# Patient Record
Sex: MALE | Race: WHITE | NOT HISPANIC OR LATINO | URBAN - METROPOLITAN AREA
[De-identification: names, ages, dates, MRNs, and addresses within clinical notes are randomized per-mention and may not be internally consistent; named-entity substitution may affect disease eponyms.]

---

## 2018-07-02 ENCOUNTER — EMERGENCY (EMERGENCY)
Facility: HOSPITAL | Age: 33
LOS: 1 days | Discharge: HOME | End: 2018-07-02
Admitting: EMERGENCY MEDICINE
Payer: MEDICAID

## 2018-07-02 VITALS
TEMPERATURE: 98 F | RESPIRATION RATE: 16 BRPM | HEART RATE: 80 BPM | DIASTOLIC BLOOD PRESSURE: 84 MMHG | OXYGEN SATURATION: 100 % | SYSTOLIC BLOOD PRESSURE: 108 MMHG

## 2018-07-02 PROCEDURE — 99283 EMERGENCY DEPT VISIT LOW MDM: CPT | Mod: 25

## 2018-07-03 PROCEDURE — 73620 X-RAY EXAM OF FOOT: CPT | Mod: 26,LT

## 2018-07-03 RX ORDER — IBUPROFEN 200 MG
600 TABLET ORAL ONCE
Qty: 0 | Refills: 0 | Status: COMPLETED | OUTPATIENT
Start: 2018-07-03 | End: 2018-07-03

## 2018-07-03 RX ADMIN — Medication 600 MILLIGRAM(S): at 00:35

## 2018-07-03 NOTE — ED PROVIDER NOTE - OBJECTIVE STATEMENT
33y/o M with no pertinent PMHx, presents to the ED with L toe pain. Pt was carrying a case of water tonight when it fell on his L foot. Pt now has pain to his L 1st and 2nd toes, worse with bearing weight. Denies numbness/tingling/weakness, any other complaints. NKDA.

## 2018-07-03 NOTE — ED PROVIDER NOTE - PSH
Ankle injury  right ankle tendon repair 1999  Cyst  left chest wall removed under local anesthesia 2007

## 2019-06-15 NOTE — ED PROVIDER NOTE - CONDITION AT DISCHARGE:
Arrives to the ED from home via private vehicle for left ear pain and dizziness of which she has had several times previously. Reports being on seizure medications. She had her \"level\" drawn this past week and her provider \"didn't like it\".  
Improved

## 2019-12-06 ENCOUNTER — EMERGENCY (EMERGENCY)
Facility: HOSPITAL | Age: 34
LOS: 1 days | Discharge: HOME | End: 2019-12-06
Attending: EMERGENCY MEDICINE | Admitting: EMERGENCY MEDICINE
Payer: MEDICAID

## 2019-12-06 VITALS
RESPIRATION RATE: 16 BRPM | TEMPERATURE: 98 F | SYSTOLIC BLOOD PRESSURE: 110 MMHG | HEART RATE: 60 BPM | DIASTOLIC BLOOD PRESSURE: 62 MMHG | OXYGEN SATURATION: 98 %

## 2019-12-06 VITALS
SYSTOLIC BLOOD PRESSURE: 130 MMHG | HEART RATE: 78 BPM | TEMPERATURE: 98 F | RESPIRATION RATE: 16 BRPM | DIASTOLIC BLOOD PRESSURE: 87 MMHG | OXYGEN SATURATION: 99 %

## 2019-12-06 LAB
COHGB MFR BLDV: 0.5 % — SIGNIFICANT CHANGE UP (ref 0.5–1.5)
COHGB MFR BLDV: 15 G/DL — SIGNIFICANT CHANGE UP (ref 13–17)
METHGB MFR BLDV: 0.6 % — SIGNIFICANT CHANGE UP (ref 0–1.5)
OXYHGB MFR BLDV: 35 % — LOW (ref 59–84)

## 2019-12-06 PROCEDURE — 99283 EMERGENCY DEPT VISIT LOW MDM: CPT

## 2019-12-06 RX ORDER — ACETAMINOPHEN 500 MG
650 TABLET ORAL ONCE
Refills: 0 | Status: COMPLETED | OUTPATIENT
Start: 2019-12-06 | End: 2019-12-06

## 2019-12-06 RX ADMIN — Medication 650 MILLIGRAM(S): at 05:10

## 2019-12-06 NOTE — ED ADULT TRIAGE NOTE - CHIEF COMPLAINT QUOTE
Pt comes in for c/o being exposed to carbon monoxide from the gas stove being on without the flame. Pt states he thought it was just smells in the apt building from the work being done, and then checked the stove and realized the stove gas was on. Pt feeling lethargic, nauseous and has a bad HA, vs as noted. EKG to be completed.

## 2019-12-06 NOTE — ED ADULT NURSE NOTE - OBJECTIVE STATEMENT
pt received alert and oriented x4. pt states he was exposed to c arbon monoxide from the gas stop being on without the flame for hours. pt states that now he feels slightly lethargic, has a massive headache and some dizziness. respirations equal and unlabored. pt declines chest pain, sob, n/v/d,fevers,chills. pt nad. nsr on cm and 97% on room air. Call bell in reach, warm blanket provided, bed in lowest position, side rails up x2,safety maintained. will continue to monitor. pt waiting on MD jiang.

## 2019-12-06 NOTE — ED PROVIDER NOTE - PHYSICAL EXAMINATION
Gen:  NAD, alert and oriented  HEENT:  PERRL, EOMI, MMM  Heart:  RRR, no M/R/G  Lung:  CTA b/l, no rales or wheeze  Abd:  ND, soft, NT  Ext:  no edema  Skin:  No rash, no ecchymosis  Neuro:  Nonfocal

## 2019-12-06 NOTE — ED PROVIDER NOTE - OBJECTIVE STATEMENT
34 y.o. male p/w headache after exposure to natural gas.  He left the gas open on his stove, but there was no flame, and he developed a headache.  He is not sure how long the gas was one, but he could smell it.  The stove has to be lit with a lighter/match and there is no  light and no other gas-burning appliances in the 11th-floor apartment.  He denies medical conditions and does not smoke.  He takes sertraline for anxiety.

## 2019-12-06 NOTE — ED PROVIDER NOTE - PATIENT PORTAL LINK FT
You can access the FollowMyHealth Patient Portal offered by [facility] by registering at the following website: [email or web address]. By joining [facility]’s FollowMyHealth portal, you will also be able to view your health information using other applications (apps) compatible with our system.

## 2019-12-06 NOTE — ED ADULT NURSE NOTE - NSIMPLEMENTINTERV_GEN_ALL_ED
Implemented All Universal Safety Interventions:  [place] to call system. Call bell, personal items and telephone within reach. Instruct patient to call for assistance. Room bathroom lighting operational. Non-slip footwear when patient is off stretcher. Physically safe environment: no spills, clutter or unnecessary equipment. Stretcher in lowest position, wheels locked, appropriate side rails in place.

## 2019-12-06 NOTE — ED PROVIDER NOTE - ATTENDING CONTRIBUTION TO CARE
MD Camarillo:  I performed a face to face bedside interview with patient regarding history of present illness, review of symptoms and past medical history. I completed an independent physical exam(documented below).  I have discussed patient's plan of care with resident.   I agree with note as stated above, having amended the EMR as needed to reflect my findings. I have discussed the assessment and plan of care.  This includes during the time I functioned as the attending physician for this patient.  PE:  Gen: Alert, NAD  Head: NC, AT,  EOMI, normal lids/conjunctiva  ENT:  normal hearing, patent oropharynx without erythema/exudate  Neck: +supple, no tenderness/meningismus/JVD, +Trachea midline  Chest: no chest wall tenderness, equal chest rise  Pulm: Bilateral BS, normal resp effort, no wheeze/stridor/retractions  CV: RRR, no M/R/G, +dist pulses  Abd: +BS, soft, NT/ND  Rectal: deferred  Mskel: no edema/erythema/cyanosis  Skin: no rash  Neuro: AAOx3, no sensory/motor deficits, CN 2-12 intact   MDM:  33yo M, pms of anxiety and "heart murmur",  c/o headache after suspected exposure to natural gas (accidentally left gas open on stove for unk period of time). Denies use or knowledge of any other gas-burning appliances inside the home. Low suspicion for CO poisoning, but will get co-ox profile, give acetaminophen for ha, ecg, reassess for likely dc.

## 2019-12-06 NOTE — ED PROVIDER NOTE - NSFOLLOWUPINSTRUCTIONS_ED_ALL_ED_FT
Please follow up with your primary care physician.  If you develop worsening symptoms, please return to the ER.

## 2019-12-06 NOTE — ED PROVIDER NOTE - CLINICAL SUMMARY MEDICAL DECISION MAKING FREE TEXT BOX
Headache after natural gas exposure.  Patient is concerned about CO exposure despite no significant source and his home CO detector did not go off.  Will send Co-ox, reassurance.

## 2020-08-24 ENCOUNTER — OUTPATIENT (OUTPATIENT)
Dept: OUTPATIENT SERVICES | Facility: HOSPITAL | Age: 35
LOS: 1 days | End: 2020-08-24
Payer: MEDICAID

## 2020-08-24 VITALS
HEIGHT: 70 IN | DIASTOLIC BLOOD PRESSURE: 79 MMHG | SYSTOLIC BLOOD PRESSURE: 125 MMHG | HEART RATE: 85 BPM | RESPIRATION RATE: 18 BRPM | WEIGHT: 194.01 LBS | TEMPERATURE: 98 F | OXYGEN SATURATION: 98 %

## 2020-08-24 DIAGNOSIS — F41.9 ANXIETY DISORDER, UNSPECIFIED: ICD-10-CM

## 2020-08-24 DIAGNOSIS — Z01.818 ENCOUNTER FOR OTHER PREPROCEDURAL EXAMINATION: ICD-10-CM

## 2020-08-24 DIAGNOSIS — M05.472: ICD-10-CM

## 2020-08-24 DIAGNOSIS — S92.415D NONDISPLACED FRACTURE OF PROXIMAL PHALANX OF LEFT GREAT TOE, SUBSEQUENT ENCOUNTER FOR FRACTURE WITH ROUTINE HEALING: ICD-10-CM

## 2020-08-24 DIAGNOSIS — M79.675 PAIN IN LEFT TOE(S): ICD-10-CM

## 2020-08-24 DIAGNOSIS — Z98.890 OTHER SPECIFIED POSTPROCEDURAL STATES: Chronic | ICD-10-CM

## 2020-08-24 LAB
HCT VFR BLD CALC: 45.5 % — SIGNIFICANT CHANGE UP (ref 39–50)
HGB BLD-MCNC: 15.2 G/DL — SIGNIFICANT CHANGE UP (ref 13–17)
MCHC RBC-ENTMCNC: 29.6 PG — SIGNIFICANT CHANGE UP (ref 27–34)
MCHC RBC-ENTMCNC: 33.4 GM/DL — SIGNIFICANT CHANGE UP (ref 32–36)
MCV RBC AUTO: 88.5 FL — SIGNIFICANT CHANGE UP (ref 80–100)
NRBC # BLD: 0 /100 WBCS — SIGNIFICANT CHANGE UP (ref 0–0)
PLATELET # BLD AUTO: 235 K/UL — SIGNIFICANT CHANGE UP (ref 150–400)
RBC # BLD: 5.14 M/UL — SIGNIFICANT CHANGE UP (ref 4.2–5.8)
RBC # FLD: 11.9 % — SIGNIFICANT CHANGE UP (ref 10.3–14.5)
WBC # BLD: 7.43 K/UL — SIGNIFICANT CHANGE UP (ref 3.8–10.5)
WBC # FLD AUTO: 7.43 K/UL — SIGNIFICANT CHANGE UP (ref 3.8–10.5)

## 2020-08-24 PROCEDURE — G0463: CPT

## 2020-08-24 PROCEDURE — 36415 COLL VENOUS BLD VENIPUNCTURE: CPT

## 2020-08-24 PROCEDURE — 85027 COMPLETE CBC AUTOMATED: CPT

## 2020-08-24 NOTE — H&P PST ADULT - ATTENDING COMMENTS
Physician assistant statement  I have interviewed the patient and reviewed the chart.   There have been no changes in the patient s medical or physical hx since  his PST's and medical clearance.    He is Covid negative.      YANELI Perales PA-C

## 2020-08-24 NOTE — H&P PST ADULT - NSICDXPASTSURGICALHX_GEN_ALL_CORE_FT
PAST SURGICAL HISTORY:  Ankle injury right ankle tendon repair 1999    Cyst left chest wall removed under local anesthesia 2007    S/P excision of lipoma 2015 - posterior cervical neck

## 2020-08-24 NOTE — H&P PST ADULT - NSICDXPASTMEDICALHX_GEN_ALL_CORE_FT
PAST MEDICAL HISTORY:  Anxiety     Heart murmur asymptomatic diagnosised as an infant    Kidney stones     TREVER (obstructive sleep apnea) does not use CPAP PAST MEDICAL HISTORY:  Anxiety     Heart murmur asymptomatic diagnosis as an infant    Kidney stones     TREVER (obstructive sleep apnea) does not use CPAP

## 2020-08-24 NOTE — H&P PST ADULT - NSICDXPROBLEM_GEN_ALL_CORE_FT
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  Pre op instructions given and patient verbalized understanding.  CBC and medical clearance pending.  COVID-19 test scheduled for 08/25/2020.  Chlorhexidine wash given with instructions.      Problem: Anxiety  Assessment and Plan: Takes medications as prescribed

## 2020-08-24 NOTE — H&P PST ADULT - NSICDXFAMILYHX_GEN_ALL_CORE_FT
FAMILY HISTORY:  FH: diabetes mellitus, pre diabetic - no medication  FH: ovarian cancer, mother -  58y/o

## 2020-08-24 NOTE — H&P PST ADULT - HISTORY OF PRESENT ILLNESS
34 y/o male with PMH of anxiety presents for PST.  Reports that in the past he had an injury to his left foot which has not healed properly and still bothers him at time.  Now scheduled for arthrodesis of interphalangeal joint left hallux with Dr Choi on 08/28/2020.  COVID 19 test scheduled on 08/25/2020 at [place].

## 2020-08-25 ENCOUNTER — APPOINTMENT (OUTPATIENT)
Dept: DISASTER EMERGENCY | Facility: CLINIC | Age: 35
End: 2020-08-25

## 2020-08-26 LAB — SARS-COV-2 N GENE NPH QL NAA+PROBE: NOT DETECTED

## 2020-08-27 ENCOUNTER — TRANSCRIPTION ENCOUNTER (OUTPATIENT)
Age: 35
End: 2020-08-27

## 2020-08-28 ENCOUNTER — OUTPATIENT (OUTPATIENT)
Dept: OUTPATIENT SERVICES | Facility: HOSPITAL | Age: 35
LOS: 1 days | End: 2020-08-28
Payer: MEDICAID

## 2020-08-28 ENCOUNTER — RESULT REVIEW (OUTPATIENT)
Age: 35
End: 2020-08-28

## 2020-08-28 VITALS
OXYGEN SATURATION: 97 % | SYSTOLIC BLOOD PRESSURE: 116 MMHG | TEMPERATURE: 98 F | RESPIRATION RATE: 16 BRPM | HEART RATE: 61 BPM | DIASTOLIC BLOOD PRESSURE: 70 MMHG

## 2020-08-28 VITALS
HEART RATE: 66 BPM | HEIGHT: 70 IN | TEMPERATURE: 97 F | SYSTOLIC BLOOD PRESSURE: 119 MMHG | DIASTOLIC BLOOD PRESSURE: 79 MMHG | WEIGHT: 194.01 LBS | OXYGEN SATURATION: 98 % | RESPIRATION RATE: 14 BRPM

## 2020-08-28 DIAGNOSIS — Z98.890 OTHER SPECIFIED POSTPROCEDURAL STATES: Chronic | ICD-10-CM

## 2020-08-28 DIAGNOSIS — M05.472: ICD-10-CM

## 2020-08-28 DIAGNOSIS — M79.675 PAIN IN LEFT TOE(S): ICD-10-CM

## 2020-08-28 DIAGNOSIS — S92.415D NONDISPLACED FRACTURE OF PROXIMAL PHALANX OF LEFT GREAT TOE, SUBSEQUENT ENCOUNTER FOR FRACTURE WITH ROUTINE HEALING: ICD-10-CM

## 2020-08-28 PROCEDURE — C1713: CPT

## 2020-08-28 PROCEDURE — 73620 X-RAY EXAM OF FOOT: CPT | Mod: 26,LT

## 2020-08-28 PROCEDURE — 76000 FLUOROSCOPY <1 HR PHYS/QHP: CPT

## 2020-08-28 PROCEDURE — 28755 FUSION OF BIG TOE JOINT: CPT | Mod: TA

## 2020-08-28 PROCEDURE — 88304 TISSUE EXAM BY PATHOLOGIST: CPT | Mod: 26

## 2020-08-28 PROCEDURE — 88311 DECALCIFY TISSUE: CPT

## 2020-08-28 PROCEDURE — 88311 DECALCIFY TISSUE: CPT | Mod: 26

## 2020-08-28 PROCEDURE — 88304 TISSUE EXAM BY PATHOLOGIST: CPT

## 2020-08-28 PROCEDURE — 73620 X-RAY EXAM OF FOOT: CPT

## 2020-08-28 RX ORDER — SODIUM CHLORIDE 9 MG/ML
1000 INJECTION, SOLUTION INTRAVENOUS
Refills: 0 | Status: DISCONTINUED | OUTPATIENT
Start: 2020-08-28 | End: 2020-08-28

## 2020-08-28 RX ORDER — HYDROMORPHONE HYDROCHLORIDE 2 MG/ML
1 INJECTION INTRAMUSCULAR; INTRAVENOUS; SUBCUTANEOUS
Refills: 0 | Status: DISCONTINUED | OUTPATIENT
Start: 2020-08-28 | End: 2020-08-28

## 2020-08-28 RX ORDER — ONDANSETRON 8 MG/1
4 TABLET, FILM COATED ORAL ONCE
Refills: 0 | Status: DISCONTINUED | OUTPATIENT
Start: 2020-08-28 | End: 2020-08-28

## 2020-08-28 RX ORDER — HYDROMORPHONE HYDROCHLORIDE 2 MG/ML
0.5 INJECTION INTRAMUSCULAR; INTRAVENOUS; SUBCUTANEOUS
Refills: 0 | Status: DISCONTINUED | OUTPATIENT
Start: 2020-08-28 | End: 2020-08-28

## 2020-08-28 RX ADMIN — SODIUM CHLORIDE 50 MILLILITER(S): 9 INJECTION, SOLUTION INTRAVENOUS at 06:42

## 2020-08-28 NOTE — ASU DISCHARGE PLAN (ADULT/PEDIATRIC) - CARE PROVIDER_API CALL
Xiomy Choi  SURGERY  [address] [place] [address]  Phone: [phone]  Fax: [phone]  Scheduled Appointment: 09/01/2020

## 2020-08-28 NOTE — ASU DISCHARGE PLAN (ADULT/PEDIATRIC) - CALL YOUR DOCTOR IF YOU HAVE ANY OF THE FOLLOWING:
Bleeding that does not stop/Pain not relieved by Medications Bleeding that does not stop/Pain not relieved by Medications/Nausea and vomiting that does not stop

## 2020-08-28 NOTE — ASU PATIENT PROFILE, ADULT - PSH
Ankle injury  right ankle tendon repair 1999  Cyst  left chest wall removed under local anesthesia 2007  S/P excision of lipoma  2015 - posterior cervical neck

## 2020-08-28 NOTE — ASU PREOP CHECKLIST - ADDITIONAL CONSENTS
Representative Consent signed for Dany Gomez. The patient is aware of the role and responsibilities of the representative.

## 2020-08-28 NOTE — ASU PATIENT PROFILE, ADULT - PMH
Anxiety    Heart murmur  asymptomatic diagnosised as an infant  Kidney stones    TREVER (obstructive sleep apnea)  does not use CPAP

## 2021-09-23 PROBLEM — N20.0 CALCULUS OF KIDNEY: Chronic | Status: ACTIVE | Noted: 2020-08-24

## 2021-09-23 PROBLEM — G47.33 OBSTRUCTIVE SLEEP APNEA (ADULT) (PEDIATRIC): Chronic | Status: ACTIVE | Noted: 2020-08-24

## 2021-10-13 ENCOUNTER — OUTPATIENT (OUTPATIENT)
Dept: OUTPATIENT SERVICES | Facility: HOSPITAL | Age: 36
LOS: 1 days | End: 2021-10-13
Payer: MEDICAID

## 2021-10-13 VITALS
RESPIRATION RATE: 18 BRPM | TEMPERATURE: 97 F | OXYGEN SATURATION: 100 % | HEART RATE: 80 BPM | WEIGHT: 194.01 LBS | HEIGHT: 70 IN | DIASTOLIC BLOOD PRESSURE: 81 MMHG | SYSTOLIC BLOOD PRESSURE: 121 MMHG

## 2021-10-13 DIAGNOSIS — Z01.818 ENCOUNTER FOR OTHER PREPROCEDURAL EXAMINATION: ICD-10-CM

## 2021-10-13 DIAGNOSIS — Z98.890 OTHER SPECIFIED POSTPROCEDURAL STATES: Chronic | ICD-10-CM

## 2021-10-13 DIAGNOSIS — M21.612 BUNION OF LEFT FOOT: Chronic | ICD-10-CM

## 2021-10-13 DIAGNOSIS — M65.872 OTHER SYNOVITIS AND TENOSYNOVITIS, LEFT ANKLE AND FOOT: ICD-10-CM

## 2021-10-13 DIAGNOSIS — Z47.2 ENCOUNTER FOR REMOVAL OF INTERNAL FIXATION DEVICE: ICD-10-CM

## 2021-10-13 LAB
HCT VFR BLD CALC: 45.8 % — SIGNIFICANT CHANGE UP (ref 39–50)
HGB BLD-MCNC: 15.5 G/DL — SIGNIFICANT CHANGE UP (ref 13–17)
MCHC RBC-ENTMCNC: 29.6 PG — SIGNIFICANT CHANGE UP (ref 27–34)
MCHC RBC-ENTMCNC: 33.8 GM/DL — SIGNIFICANT CHANGE UP (ref 32–36)
MCV RBC AUTO: 87.4 FL — SIGNIFICANT CHANGE UP (ref 80–100)
NRBC # BLD: 0 /100 WBCS — SIGNIFICANT CHANGE UP (ref 0–0)
PLATELET # BLD AUTO: 233 K/UL — SIGNIFICANT CHANGE UP (ref 150–400)
RBC # BLD: 5.24 M/UL — SIGNIFICANT CHANGE UP (ref 4.2–5.8)
RBC # FLD: 12.1 % — SIGNIFICANT CHANGE UP (ref 10.3–14.5)
WBC # BLD: 7.34 K/UL — SIGNIFICANT CHANGE UP (ref 3.8–10.5)
WBC # FLD AUTO: 7.34 K/UL — SIGNIFICANT CHANGE UP (ref 3.8–10.5)

## 2021-10-13 PROCEDURE — 36415 COLL VENOUS BLD VENIPUNCTURE: CPT

## 2021-10-13 PROCEDURE — G0463: CPT

## 2021-10-13 PROCEDURE — 85027 COMPLETE CBC AUTOMATED: CPT

## 2021-10-13 NOTE — H&P PST ADULT - PROBLEM SELECTOR PLAN 1
Scheduled for removal of hardware left hallux, synovectomy left foot with Dr Choi on 10/22/2021.  CBC and medical clearance pending.  Pre op instructions given and patient verbalized understanding.  COVID-19 testing TBS - information provided.  NPO after midnight night before surgery.  TREVER precautions - hx TREVER - no CPAP use.  Told to stop ASA NSAIDs, vitamins and suppleemtns 1 week prior to surgery

## 2021-10-13 NOTE — H&P PST ADULT - NSICDXPASTSURGICALHX_GEN_ALL_CORE_FT
PAST SURGICAL HISTORY:  Ankle injury right ankle tendon repair 1999    Bunion, left foot 2020    Cyst left chest wall removed under local anesthesia 2007    S/P excision of lipoma 2015 - posterior cervical neck

## 2021-10-13 NOTE — H&P PST ADULT - NSICDXPASTMEDICALHX_GEN_ALL_CORE_FT
PAST MEDICAL HISTORY:  Anxiety     Heart murmur asymptomatic diagnosised as an infant    Kidney stones     TREVER (obstructive sleep apnea) does not use CPAP

## 2021-10-13 NOTE — H&P PST ADULT - NSICDXFAMILYHX_GEN_ALL_CORE_FT
FAMILY HISTORY:  FH: diabetes mellitus, pre diabetic - no medication  FH: ovarian cancer, mother -  56y/o

## 2021-10-13 NOTE — H&P PST ADULT - ATTENDING COMMENTS
Pt seen at bedside, chart reviewed. Pt feels well and is in his usual state of health. PMD cleared pt for the aforementioned procedure     Kacie Marin PA-C 10/22/21

## 2021-10-13 NOTE — H&P PST ADULT - HISTORY OF PRESENT ILLNESS
36 y/o male with PMH of anxiety presents for PST.  Reports that in the past he had an injury to his left foot which has not healed properly and still bothers him at time.  Now scheduled for arthrodesis of interphalangeal joint left hallux with Dr Choi on 08/28/2020.  COVID 19 test scheduled on 08/25/2020 at [place]. 34 y/o male with PMH of anxiety presents for PST. C/o left foot pain due to hardware placed with surgery last August.  Scheduled for removal of hardware left hallux, synovectomy left foot with Dr Choi on 10/22/2021.  COVID-19 testing TBS - information provided

## 2021-10-19 ENCOUNTER — APPOINTMENT (OUTPATIENT)
Dept: DISASTER EMERGENCY | Facility: CLINIC | Age: 36
End: 2021-10-19

## 2021-10-20 LAB — SARS-COV-2 N GENE NPH QL NAA+PROBE: NOT DETECTED

## 2021-10-21 ENCOUNTER — TRANSCRIPTION ENCOUNTER (OUTPATIENT)
Age: 36
End: 2021-10-21

## 2021-10-22 ENCOUNTER — OUTPATIENT (OUTPATIENT)
Dept: OUTPATIENT SERVICES | Facility: HOSPITAL | Age: 36
LOS: 1 days | End: 2021-10-22
Payer: MEDICAID

## 2021-10-22 ENCOUNTER — RESULT REVIEW (OUTPATIENT)
Age: 36
End: 2021-10-22

## 2021-10-22 VITALS
SYSTOLIC BLOOD PRESSURE: 116 MMHG | RESPIRATION RATE: 16 BRPM | OXYGEN SATURATION: 96 % | TEMPERATURE: 97 F | DIASTOLIC BLOOD PRESSURE: 70 MMHG | HEART RATE: 62 BPM

## 2021-10-22 VITALS
RESPIRATION RATE: 14 BRPM | TEMPERATURE: 98 F | OXYGEN SATURATION: 96 % | WEIGHT: 194.01 LBS | HEART RATE: 63 BPM | HEIGHT: 70 IN | DIASTOLIC BLOOD PRESSURE: 72 MMHG | SYSTOLIC BLOOD PRESSURE: 112 MMHG

## 2021-10-22 DIAGNOSIS — M21.612 BUNION OF LEFT FOOT: Chronic | ICD-10-CM

## 2021-10-22 DIAGNOSIS — Z47.2 ENCOUNTER FOR REMOVAL OF INTERNAL FIXATION DEVICE: ICD-10-CM

## 2021-10-22 DIAGNOSIS — Z98.890 OTHER SPECIFIED POSTPROCEDURAL STATES: Chronic | ICD-10-CM

## 2021-10-22 DIAGNOSIS — M65.872 OTHER SYNOVITIS AND TENOSYNOVITIS, LEFT ANKLE AND FOOT: ICD-10-CM

## 2021-10-22 PROCEDURE — 73620 X-RAY EXAM OF FOOT: CPT

## 2021-10-22 PROCEDURE — 88300 SURGICAL PATH GROSS: CPT

## 2021-10-22 PROCEDURE — 20680 REMOVAL OF IMPLANT DEEP: CPT

## 2021-10-22 PROCEDURE — 73620 X-RAY EXAM OF FOOT: CPT | Mod: 26,LT

## 2021-10-22 PROCEDURE — 88300 SURGICAL PATH GROSS: CPT | Mod: 26

## 2021-10-22 RX ORDER — MULTIVIT-MIN/FERROUS GLUCONATE 9 MG/15 ML
1 LIQUID (ML) ORAL
Qty: 0 | Refills: 0 | DISCHARGE

## 2021-10-22 RX ORDER — OXYCODONE HYDROCHLORIDE 5 MG/1
5 TABLET ORAL ONCE
Refills: 0 | Status: DISCONTINUED | OUTPATIENT
Start: 2021-10-22 | End: 2021-10-22

## 2021-10-22 RX ORDER — SERTRALINE 25 MG/1
1 TABLET, FILM COATED ORAL
Qty: 0 | Refills: 0 | DISCHARGE

## 2021-10-22 RX ORDER — SODIUM CHLORIDE 9 MG/ML
1000 INJECTION, SOLUTION INTRAVENOUS
Refills: 0 | Status: DISCONTINUED | OUTPATIENT
Start: 2021-10-22 | End: 2021-10-22

## 2021-10-22 RX ORDER — IBUPROFEN 200 MG
2 TABLET ORAL
Qty: 0 | Refills: 0 | DISCHARGE

## 2021-10-22 RX ADMIN — SODIUM CHLORIDE 50 MILLILITER(S): 9 INJECTION, SOLUTION INTRAVENOUS at 09:55

## 2021-10-22 NOTE — ASU DISCHARGE PLAN (ADULT/PEDIATRIC) - CARE PROVIDER_API CALL
Xiomy Choi (DPM)  Surgery  [facility] [place] [address]  Phone: [phone]  Fax: [phone]  Follow Up Time:

## 2021-10-22 NOTE — ASU PATIENT PROFILE, ADULT - TEACHING/LEARNING RELIGIOUS CONSIDERATIONS
none Duration Of Freeze Thaw-Cycle (Seconds): 0 Render Post-Care Instructions In Note?: no Post-Care Instructions: I reviewed with the patient in detail post-care instructions. Patient is to wear sunprotection, and avoid picking at any of the treated lesions. Pt may apply Vaseline to crusted or scabbing areas. Detail Level: Detailed Consent: The patient's consent was obtained including but not limited to risks of crusting, scabbing, blistering, scarring, darker or lighter pigmentary change, recurrence, incomplete removal and infection. Number Of Freeze-Thaw Cycles: 2 freeze-thaw cycles

## 2021-10-27 LAB — SURGICAL PATHOLOGY STUDY: SIGNIFICANT CHANGE UP

## 2022-04-14 ENCOUNTER — EMERGENCY (EMERGENCY)
Facility: HOSPITAL | Age: 37
LOS: 1 days | Discharge: HOME | End: 2022-04-14
Attending: EMERGENCY MEDICINE | Admitting: EMERGENCY MEDICINE
Payer: MEDICAID

## 2022-04-14 VITALS
OXYGEN SATURATION: 100 % | HEIGHT: 70 IN | SYSTOLIC BLOOD PRESSURE: 118 MMHG | TEMPERATURE: 98 F | DIASTOLIC BLOOD PRESSURE: 72 MMHG | HEART RATE: 82 BPM | RESPIRATION RATE: 18 BRPM

## 2022-04-14 DIAGNOSIS — Z98.890 OTHER SPECIFIED POSTPROCEDURAL STATES: Chronic | ICD-10-CM

## 2022-04-14 DIAGNOSIS — M21.612 BUNION OF LEFT FOOT: Chronic | ICD-10-CM

## 2022-04-14 PROCEDURE — 99283 EMERGENCY DEPT VISIT LOW MDM: CPT

## 2022-04-14 RX ORDER — LIDOCAINE HYDROCHLORIDE AND EPINEPHRINE 10; 10 MG/ML; UG/ML
20 INJECTION, SOLUTION INFILTRATION; PERINEURAL ONCE
Refills: 0 | Status: COMPLETED | OUTPATIENT
Start: 2022-04-14 | End: 2022-04-14

## 2022-04-14 RX ORDER — ACETAMINOPHEN 500 MG
975 TABLET ORAL ONCE
Refills: 0 | Status: COMPLETED | OUTPATIENT
Start: 2022-04-14 | End: 2022-04-14

## 2022-04-14 RX ORDER — IBUPROFEN 200 MG
400 TABLET ORAL ONCE
Refills: 0 | Status: COMPLETED | OUTPATIENT
Start: 2022-04-14 | End: 2022-04-14

## 2022-04-14 RX ADMIN — Medication 975 MILLIGRAM(S): at 21:16

## 2022-04-14 RX ADMIN — Medication 400 MILLIGRAM(S): at 21:16

## 2022-04-14 RX ADMIN — Medication 300 MILLIGRAM(S): at 21:16

## 2022-04-14 RX ADMIN — LIDOCAINE HYDROCHLORIDE AND EPINEPHRINE 20 MILLILITER(S): 10; 10 INJECTION, SOLUTION INFILTRATION; PERINEURAL at 21:22

## 2022-04-14 NOTE — ED PROVIDER NOTE - OBJECTIVE STATEMENT
Nadia: History of recurrent cysts, such as on chest. Presents with two days cyst on right posterior scalp. No fever. It is painful. He takes ibuprofen. POCUS reveals a small food collection.

## 2022-04-14 NOTE — ED PROVIDER NOTE - PATIENT PORTAL LINK FT
You can access the FollowMyHealth Patient Portal offered by [facility] by registering at the following website: [email or web address]. By joining [facility]’s FollowMyHealth portal, you will also be able to view your health information using other applications (apps) compatible with our system.

## 2022-04-14 NOTE — ED PROVIDER NOTE - NSPTACCESSSVCSAPPTDETAILS_ED_ALL_ED_FT
Recurrent skin cysts. Has one on his scalp that was incised and drained. Has had a Dermatologist remove the cysts in the past.

## 2022-04-14 NOTE — ED ADULT TRIAGE NOTE - CHIEF COMPLAINT QUOTE
2 days ago noticed small cyst on R side of scalp - HX cysts - area is raised reddened with skin intact - no other symptoms today

## 2022-04-14 NOTE — ED PROVIDER NOTE - CLINICAL SUMMARY MEDICAL DECISION MAKING FREE TEXT BOX
Nadia: Likely abscess or cyst. Will do incision and drainage. Clindamycin and pain medication. Follow up with a Dermatologist.

## 2022-04-14 NOTE — ED PROVIDER NOTE - NSFOLLOWUPINSTRUCTIONS_ED_ALL_ED_FT
Take medications as prescribed for: cyst, possible early abscess.    Tylenol 500 mg (1 or 2 every 6 hours) and/or naproxen 500 mg (1 every 12 hours) for pain.     You will receive a call from our Care Coordinators to arrange an appointment with a Dermatologist for consideration for cyst removal.    Return if fever, or if swelling returns.

## 2022-04-14 NOTE — ED PROVIDER NOTE - PHYSICAL EXAMINATION
Well appearing, well nourished, awake, alert, oriented to person, place, time/situation and in no apparent distress.    Airway patent    Eyes without scleral injection. No jaundice.    Strong pulse.    Respirations unlabored.    Abdomen soft, non-tender, no guarding.    Spine appears normal, range of motion is not limited, no muscle or joint tenderness.    Alert and oriented, no gross motor or sensory deficits.    Skin: 3 cm raised, red, mildly-tender area posterior top of scalp.    No SI/HI.

## 2022-04-14 NOTE — ED ADULT NURSE NOTE - OBJECTIVE STATEMENT
Pt A&Ox4, ambulatory. PT in NAD,resp equal nand unlabored. pt presents with cyst on the top of his head after getting a haircut. PT c/o of pain, redness and headache. PT denies; discharge, blurred vision, fever/chills.

## 2023-05-16 NOTE — ASU PATIENT PROFILE, ADULT - FALL HARM RISK CONCLUSION
Universal Safety Interventions Cephalexin Pregnancy And Lactation Text: This medication is Pregnancy Category B and considered safe during pregnancy.  It is also excreted in breast milk but can be used safely for shorter doses.

## 2023-07-14 NOTE — ED ADULT TRIAGE NOTE - SPO2 (%)
[facility] Note    Chief Complaint      Chief Complaint   Patient presents with    Pre-op Exam     History of Present Illness      Socorro Huang is a 46 y.o. female patient of Dr. Adams's with chronic conditions of chronic migraine, anxiety, hypothyroid, GERD, insomnia who presents today for medical clearance for right foot great toe surgery with Dr. Ragsdale.  Surgery date not made yet.  Patient feeling well no complaints, denies shortness of breath or chest pain reviewed meds and history patient.  No concerns of bowel or bladder dysfunction, patient stays hydrated and active.    Labs ordered  Allergies:  Celexa-nausea and stomach upset    Vital signs:  110/70, 73, 16,98%RA    PSHx:  Left sigmoidectomy, colonoscopy, breast reconstruction, partial hysterectomy, oophorectomy, thyroidectomy partial, tonsillectomy, tubal ligation    ELAINE:  Negative  Problems with anesthesia:  None  Stable on all medications    Patient never smoked    Health Maintenance   Topic Date Due    Hepatitis C Screening  Never done    Mammogram  11/05/2022    TETANUS VACCINE  11/14/2022    Lipid Panel  10/03/2027    DEXA Scan  Discontinued       Past Medical History:   Diagnosis Date    Abnormal Pap smear     Anxiety     celexa & prozac didn't help much    Constipation - functional     Headache(784.0)     Hemorrhoid     Leukopenia     benign, evaluated in [facility] y 2000    Menorrhagia     Strain of neck muscle     tx with PT at [facility] in past       Past Surgical History:   Procedure Laterality Date    ANOSCOPY N/A 07/20/2022    Procedure: ANOSCOPY;  Surgeon: ASHTYN Meol MD;  Location: [address];  Service: Colon and Rectal;  Laterality: N/A;    BREAST RECONSTRUCTION  2018    BREAST SURGERY      COLON SURGERY  2014         COLONOSCOPY N/A 02/03/2022    Procedure: COLONOSCOPY;  Surgeon: ASHTYN Melo MD;  Location: [facility];  Service: Endoscopy;  Laterality: N/A;    DILATION AND CURETTAGE OF UTERUS   12/18/2012    complex hyperplasia, no atypia    FLEXIBLE SIGMOIDOSCOPY N/A 07/20/2022    Procedure: SIGMOIDOSCOPY, FLEXIBLE;  Surgeon: ASHTYN Melo MD;  Location: [facility] OR 2ND FLR;  Service: Colon and Rectal;  Laterality: N/A;    HEMORRHOID SURGERY  2014    HYSTERECTOMY      LAPAROSCOPIC SIGMOIDECTOMY Left 07/20/2022    Procedure: COLECTOMY, SIGMOID, LAPAROSCOPIC, ERAS low;  Surgeon: ASHTYN Melo MD;  Location: [facility] OR 2ND FLR;  Service: Colon and Rectal;  Laterality: Left;    MOBILIZATION OF SPLENIC FLEXURE N/A 07/20/2022    Procedure: MOBILIZATION, SPLENIC FLEXURE;  Surgeon: ASHTYN Melo MD;  Location: [facility] OR 2ND FLR;  Service: Colon and Rectal;  Laterality: N/A;    OOPHORECTOMY      PARTIAL HYSTERECTOMY  2013    for atypia    SMALL INTESTINE SURGERY  2017    THYROIDECTOMY, PARTIAL  2013    TONSILLECTOMY      TUBAL LIGATION         family history includes Alcohol abuse in her cousin and maternal uncle; Breast cancer in her maternal grandmother; COPD in her mother; Cancer in her father and mother; Depression in her sister; Diabetes in her mother and paternal grandmother; Drug abuse in her maternal aunt; Emphysema in her mother; Heart disease in her father; Hypertension in her paternal grandmother; Ovarian cancer in her maternal grandmother; Schizophrenia in her paternal aunt; Suicide in her brother.    Social History     Tobacco Use    Smoking status: Never    Smokeless tobacco: Never   Substance Use Topics    Alcohol use: Not Currently     Comment: socially    Drug use: No       Review of Systems   Constitutional:  Negative for chills and fever.   HENT:  Negative for congestion, sinus pain and sore throat.    Eyes:  Negative for blurred vision.   Respiratory:  Negative for cough, shortness of breath and wheezing.    Cardiovascular:  Negative for chest pain, palpitations and leg swelling.   Gastrointestinal:  Negative for abdominal pain, constipation, diarrhea, nausea and vomiting.    Genitourinary:  Negative for dysuria.   Musculoskeletal:  Positive for joint pain. Negative for myalgias.   Skin:  Negative for rash.   Neurological:  Negative for dizziness, weakness and headaches.   Psychiatric/Behavioral:  Negative for depression. The patient is not nervous/anxious.       Outpatient Encounter Medications as of 7/14/2023   Medication Sig Note Dispense Refill    butalbital-acetaminophen-caffeine -40 mg (FIORICET, ESGIC) -40 mg per tablet Take 1 tablet by mouth every 6 (six) hours.  12 tablet 5    co-enzyme Q-10 30 mg capsule Take by mouth.       fluticasone propionate (FLONASE) 50 mcg/actuation nasal spray 1 spray (50 mcg total) by Each Nostril route once daily. 7/19/2022: Take as prescribed am of procedure                      18 g 0    magnesium oxide (MAG-OX) 400 mg (241.3 mg magnesium) tablet Take 1 tablet (400 mg total) by mouth once daily.  30 tablet 5    traZODone (DESYREL) 100 MG tablet TAKE 1 TABLET BY MOUTH EVERY DAY IN THE EVENING  90 tablet 1    hydrOXYzine HCL (ATARAX) 25 MG tablet Take 1 tab at night as needed for anxiety/sleep (Patient not taking: Reported on 7/14/2023)  30 tablet 0    methylPREDNISolone (MEDROL DOSEPACK) 4 mg tablet use as directed (Patient not taking: Reported on 7/14/2023)  1 each 0    pantoprazole (PROTONIX) 40 MG tablet Take 1 tablet (40 mg total) by mouth once daily. (Patient not taking: Reported on 7/14/2023)  30 tablet 11    [DISCONTINUED] butalbital-acetaminophen-caffeine -40 mg (FIORICET, ESGIC) -40 mg per tablet Take 1 tablet by mouth every 6 (six) hours.        No facility-administered encounter medications on file as of 7/14/2023.        Review of patient's allergies indicates:   Allergen Reactions    Celexa [citalopram] Nausea Only     Stomach upset       Physical Exam      Vital Signs  Pulse: 73  Resp: 16  SpO2: 98 %  BP: 110/70  BP Location: Right arm  Patient Position: Sitting  Height and Weight  Weight: 71.4 kg (157 lb 6.5  oz)    Physical Exam  Vitals and nursing note reviewed.   Constitutional:       General: She is not in acute distress.     Appearance: Normal appearance. She is well-developed. She is not ill-appearing.   HENT:      Head: Normocephalic and atraumatic.      Right Ear: External ear normal.      Left Ear: External ear normal.   Eyes:      Conjunctiva/sclera: Conjunctivae normal.      Pupils: Pupils are equal, round, and reactive to light.   Neck:      Thyroid: No thyromegaly.      Vascular: No JVD.      Trachea: No tracheal deviation.   Cardiovascular:      Rate and Rhythm: Normal rate and regular rhythm.      Heart sounds: Normal heart sounds. No murmur heard.  Pulmonary:      Effort: Pulmonary effort is normal.      Breath sounds: Normal breath sounds.   Chest:      Chest wall: No tenderness.   Abdominal:      General: Bowel sounds are normal.      Palpations: Abdomen is soft.      Tenderness: There is no abdominal tenderness. There is no guarding.   Musculoskeletal:         General: Tenderness present. No swelling or deformity. Normal range of motion.      Cervical back: Normal range of motion and neck supple.      Right lower leg: No edema.   Lymphadenopathy:      Cervical: No cervical adenopathy.   Skin:     General: Skin is warm and dry.   Neurological:      General: No focal deficit present.      Mental Status: She is alert and oriented to person, place, and time.   Psychiatric:         Mood and Affect: Mood normal.         Behavior: Behavior normal.         Thought Content: Thought content normal.         Judgment: Judgment normal.        Laboratory:  CBC:  Lab Results   Component Value Date    WBC 3.14 (L) 01/13/2023    RBC 4.59 01/13/2023    HGB 14.8 01/13/2023    HCT 42.8 01/13/2023     01/13/2023    MCV 93 01/13/2023    MCH 32.2 (H) 01/13/2023    MCHC 34.6 01/13/2023    MCHC 33.6 10/03/2022    MCHC 34.0 07/21/2022     CMP:  Lab Results   Component Value Date    GLU 87 10/03/2022    CALCIUM 9.1  10/03/2022    ALBUMIN 3.9 10/03/2022    PROT 6.4 10/03/2022     10/03/2022    K 4.0 10/03/2022    CO2 23 10/03/2022     10/03/2022    BUN 15 10/03/2022    ALKPHOS 79 10/03/2022    ALT 66 (H) 10/03/2022    AST 35 10/03/2022    BILITOT 0.5 10/03/2022    BILITOT 0.4 11/27/2021    BILITOT 0.6 10/27/2014     URINALYSIS:  Lab Results   Component Value Date    COLORU Colorless (A) 08/09/2022    SPECGRAV 1.010 08/09/2022    PHUR 7.0 08/09/2022    PROTEINUA Negative 08/09/2022    BACTERIA Few (A) 10/08/2013    NITRITE Negative 08/09/2022    LEUKOCYTESUR Negative 08/09/2022    UROBILINOGEN Negative 10/27/2014      LIPIDS:  Lab Results   Component Value Date    TSH 0.897 10/03/2022    TSH 1.659 11/22/2017    TSH 1.846 08/01/2012    HDL 65 10/03/2022    HDL 54 10/27/2014    HDL 55 04/12/2013    CHOL 179 10/03/2022    CHOL 140 10/27/2014    CHOL 135 04/12/2013    TRIG 51 10/03/2022    TRIG 37 10/27/2014    TRIG 50 04/12/2013    LDLCALC 103.8 10/03/2022    LDLCALC 78.6 10/27/2014    LDLCALC 70.0 04/12/2013    CHOLHDL 36.3 10/03/2022    CHOLHDL 38.6 10/27/2014    CHOLHDL 40.7 04/12/2013    NONHDLCHOL 114 10/03/2022    NONHDLCHOL 86 10/27/2014    TOTALCHOLEST 2.8 10/03/2022    TOTALCHOLEST 2.6 10/27/2014    TOTALCHOLEST 2.5 04/12/2013     TSH:  Lab Results   Component Value Date    TSH 0.897 10/03/2022    TSH 1.659 11/22/2017    TSH 1.846 08/01/2012     A1C:  No results found for: HGBA1C      Assessment/Plan     Socorro Huang is a 46 y.o.female with:    Pre-op examination  -     CBC Auto Differential; Future; Expected date: 07/14/2023  -     Comprehensive Metabolic Panel; Future; Expected date: 07/14/2023    Hallux limitus of right foot  -     CBC Auto Differential; Future; Expected date: 07/14/2023  -     Comprehensive Metabolic Panel; Future; Expected date: 07/14/2023    Great toe pain, right  -     CBC Auto Differential; Future; Expected date: 07/14/2023  -     Comprehensive Metabolic Panel; Future; Expected date:  07/14/2023    Anxiety     PATIENT MEDICALLY OPTIMIZED FOR LOW RISK PROCEDURE AT LOW CARDIAC RISK PROFILE AND MAY CONTINUE WITH SURGERY     Pt will need knee scooter, crutches, and shower chair    Health Maintenance Due   Topic Date Due    Hepatitis C Screening  Never done    HIV Screening  Never done    COVID-19 Vaccine (4 - Pfizer series) 11/26/2021    Mammogram  11/05/2022    TETANUS VACCINE  11/14/2022        I spent 32 minutes on the day of this encounter for preparing for, evaluating, treating, and managing this patient.      -Continue current medications and maintain follow up with specialists.  Return to clinic AS NEEDED FOR ANY CONCERNS.    No follow-ups on file.       AGUSTIN SheltonC  [facility]                   99

## 2023-10-04 ENCOUNTER — EMERGENCY (EMERGENCY)
Facility: HOSPITAL | Age: 38
LOS: 1 days | Discharge: HOME | End: 2023-10-04
Admitting: EMERGENCY MEDICINE
Payer: MEDICAID

## 2023-10-04 VITALS
SYSTOLIC BLOOD PRESSURE: 121 MMHG | RESPIRATION RATE: 16 BRPM | HEART RATE: 74 BPM | DIASTOLIC BLOOD PRESSURE: 80 MMHG | TEMPERATURE: 98 F | OXYGEN SATURATION: 97 %

## 2023-10-04 VITALS
OXYGEN SATURATION: 99 % | SYSTOLIC BLOOD PRESSURE: 118 MMHG | DIASTOLIC BLOOD PRESSURE: 67 MMHG | RESPIRATION RATE: 16 BRPM | HEART RATE: 72 BPM

## 2023-10-04 DIAGNOSIS — M21.612 BUNION OF LEFT FOOT: Chronic | ICD-10-CM

## 2023-10-04 DIAGNOSIS — Z98.890 OTHER SPECIFIED POSTPROCEDURAL STATES: Chronic | ICD-10-CM

## 2023-10-04 LAB
ALBUMIN SERPL ELPH-MCNC: 4.4 G/DL — SIGNIFICANT CHANGE UP (ref 3.3–5)
ALP SERPL-CCNC: 59 U/L — SIGNIFICANT CHANGE UP (ref 40–120)
ALT FLD-CCNC: 33 U/L — SIGNIFICANT CHANGE UP (ref 4–41)
ANION GAP SERPL CALC-SCNC: 8 MMOL/L — SIGNIFICANT CHANGE UP (ref 7–14)
APPEARANCE UR: CLEAR — SIGNIFICANT CHANGE UP
AST SERPL-CCNC: 25 U/L — SIGNIFICANT CHANGE UP (ref 4–40)
B PERT DNA SPEC QL NAA+PROBE: SIGNIFICANT CHANGE UP
B PERT+PARAPERT DNA PNL SPEC NAA+PROBE: SIGNIFICANT CHANGE UP
BASOPHILS # BLD AUTO: 0.04 K/UL — SIGNIFICANT CHANGE UP (ref 0–0.2)
BASOPHILS NFR BLD AUTO: 0.5 % — SIGNIFICANT CHANGE UP (ref 0–2)
BILIRUB SERPL-MCNC: 0.4 MG/DL — SIGNIFICANT CHANGE UP (ref 0.2–1.2)
BILIRUB UR-MCNC: NEGATIVE — SIGNIFICANT CHANGE UP
BORDETELLA PARAPERTUSSIS (RAPRVP): SIGNIFICANT CHANGE UP
BUN SERPL-MCNC: 11 MG/DL — SIGNIFICANT CHANGE UP (ref 7–23)
C PNEUM DNA SPEC QL NAA+PROBE: SIGNIFICANT CHANGE UP
CALCIUM SERPL-MCNC: 9.9 MG/DL — SIGNIFICANT CHANGE UP (ref 8.4–10.5)
CHLORIDE SERPL-SCNC: 105 MMOL/L — SIGNIFICANT CHANGE UP (ref 98–107)
CO2 SERPL-SCNC: 27 MMOL/L — SIGNIFICANT CHANGE UP (ref 22–31)
COLOR SPEC: YELLOW — SIGNIFICANT CHANGE UP
CREAT SERPL-MCNC: 0.95 MG/DL — SIGNIFICANT CHANGE UP (ref 0.5–1.3)
DIFF PNL FLD: NEGATIVE — SIGNIFICANT CHANGE UP
EGFR: 104 ML/MIN/1.73M2 — SIGNIFICANT CHANGE UP
EOSINOPHIL # BLD AUTO: 0.15 K/UL — SIGNIFICANT CHANGE UP (ref 0–0.5)
EOSINOPHIL NFR BLD AUTO: 1.8 % — SIGNIFICANT CHANGE UP (ref 0–6)
FLUAV SUBTYP SPEC NAA+PROBE: SIGNIFICANT CHANGE UP
FLUBV RNA SPEC QL NAA+PROBE: SIGNIFICANT CHANGE UP
GLUCOSE SERPL-MCNC: 96 MG/DL — SIGNIFICANT CHANGE UP (ref 70–99)
GLUCOSE UR QL: NEGATIVE MG/DL — SIGNIFICANT CHANGE UP
HADV DNA SPEC QL NAA+PROBE: SIGNIFICANT CHANGE UP
HCOV 229E RNA SPEC QL NAA+PROBE: SIGNIFICANT CHANGE UP
HCOV HKU1 RNA SPEC QL NAA+PROBE: SIGNIFICANT CHANGE UP
HCOV NL63 RNA SPEC QL NAA+PROBE: SIGNIFICANT CHANGE UP
HCOV OC43 RNA SPEC QL NAA+PROBE: SIGNIFICANT CHANGE UP
HCT VFR BLD CALC: 40.9 % — SIGNIFICANT CHANGE UP (ref 39–50)
HGB BLD-MCNC: 14.1 G/DL — SIGNIFICANT CHANGE UP (ref 13–17)
HMPV RNA SPEC QL NAA+PROBE: SIGNIFICANT CHANGE UP
HPIV1 RNA SPEC QL NAA+PROBE: SIGNIFICANT CHANGE UP
HPIV2 RNA SPEC QL NAA+PROBE: SIGNIFICANT CHANGE UP
HPIV3 RNA SPEC QL NAA+PROBE: SIGNIFICANT CHANGE UP
HPIV4 RNA SPEC QL NAA+PROBE: SIGNIFICANT CHANGE UP
IANC: 5.17 K/UL — SIGNIFICANT CHANGE UP (ref 1.8–7.4)
IMM GRANULOCYTES NFR BLD AUTO: 0.4 % — SIGNIFICANT CHANGE UP (ref 0–0.9)
KETONES UR-MCNC: NEGATIVE MG/DL — SIGNIFICANT CHANGE UP
LEUKOCYTE ESTERASE UR-ACNC: NEGATIVE — SIGNIFICANT CHANGE UP
LYMPHOCYTES # BLD AUTO: 2.44 K/UL — SIGNIFICANT CHANGE UP (ref 1–3.3)
LYMPHOCYTES # BLD AUTO: 28.6 % — SIGNIFICANT CHANGE UP (ref 13–44)
M PNEUMO DNA SPEC QL NAA+PROBE: SIGNIFICANT CHANGE UP
MCHC RBC-ENTMCNC: 29 PG — SIGNIFICANT CHANGE UP (ref 27–34)
MCHC RBC-ENTMCNC: 34.5 GM/DL — SIGNIFICANT CHANGE UP (ref 32–36)
MCV RBC AUTO: 84.2 FL — SIGNIFICANT CHANGE UP (ref 80–100)
MONOCYTES # BLD AUTO: 0.7 K/UL — SIGNIFICANT CHANGE UP (ref 0–0.9)
MONOCYTES NFR BLD AUTO: 8.2 % — SIGNIFICANT CHANGE UP (ref 2–14)
NEUTROPHILS # BLD AUTO: 5.17 K/UL — SIGNIFICANT CHANGE UP (ref 1.8–7.4)
NEUTROPHILS NFR BLD AUTO: 60.5 % — SIGNIFICANT CHANGE UP (ref 43–77)
NITRITE UR-MCNC: NEGATIVE — SIGNIFICANT CHANGE UP
NRBC # BLD: 0 /100 WBCS — SIGNIFICANT CHANGE UP (ref 0–0)
NRBC # FLD: 0 K/UL — SIGNIFICANT CHANGE UP (ref 0–0)
PH UR: 5.5 — SIGNIFICANT CHANGE UP (ref 5–8)
PLATELET # BLD AUTO: 220 K/UL — SIGNIFICANT CHANGE UP (ref 150–400)
POTASSIUM SERPL-MCNC: 4.3 MMOL/L — SIGNIFICANT CHANGE UP (ref 3.5–5.3)
POTASSIUM SERPL-SCNC: 4.3 MMOL/L — SIGNIFICANT CHANGE UP (ref 3.5–5.3)
PROT SERPL-MCNC: 6.8 G/DL — SIGNIFICANT CHANGE UP (ref 6–8.3)
PROT UR-MCNC: NEGATIVE MG/DL — SIGNIFICANT CHANGE UP
RAPID RVP RESULT: DETECTED
RBC # BLD: 4.86 M/UL — SIGNIFICANT CHANGE UP (ref 4.2–5.8)
RBC # FLD: 12.8 % — SIGNIFICANT CHANGE UP (ref 10.3–14.5)
RSV RNA SPEC QL NAA+PROBE: SIGNIFICANT CHANGE UP
RV+EV RNA SPEC QL NAA+PROBE: DETECTED
SARS-COV-2 RNA SPEC QL NAA+PROBE: SIGNIFICANT CHANGE UP
SODIUM SERPL-SCNC: 140 MMOL/L — SIGNIFICANT CHANGE UP (ref 135–145)
SP GR SPEC: 1.02 — SIGNIFICANT CHANGE UP (ref 1–1.03)
TROPONIN T, HIGH SENSITIVITY RESULT: <6 NG/L — SIGNIFICANT CHANGE UP
UROBILINOGEN FLD QL: 0.2 MG/DL — SIGNIFICANT CHANGE UP (ref 0.2–1)
WBC # BLD: 8.53 K/UL — SIGNIFICANT CHANGE UP (ref 3.8–10.5)
WBC # FLD AUTO: 8.53 K/UL — SIGNIFICANT CHANGE UP (ref 3.8–10.5)

## 2023-10-04 PROCEDURE — 71046 X-RAY EXAM CHEST 2 VIEWS: CPT | Mod: 26

## 2023-10-04 PROCEDURE — 93010 ELECTROCARDIOGRAM REPORT: CPT

## 2023-10-04 PROCEDURE — 99285 EMERGENCY DEPT VISIT HI MDM: CPT

## 2023-10-04 RX ORDER — SODIUM CHLORIDE 9 MG/ML
1000 INJECTION INTRAMUSCULAR; INTRAVENOUS; SUBCUTANEOUS ONCE
Refills: 0 | Status: COMPLETED | OUTPATIENT
Start: 2023-10-04 | End: 2023-10-04

## 2023-10-04 RX ADMIN — SODIUM CHLORIDE 1000 MILLILITER(S): 9 INJECTION INTRAMUSCULAR; INTRAVENOUS; SUBCUTANEOUS at 21:18

## 2023-10-04 NOTE — ED ADULT NURSE NOTE - NSFALLUNIVINTERV_ED_ALL_ED
Bed/Stretcher in lowest position, wheels locked, appropriate side rails in place/Call bell, personal items and telephone in reach/Instruct patient to call for assistance before getting out of bed/chair/stretcher/Non-slip footwear applied when patient is off stretcher/[place] to call system/Physically safe environment - no spills, clutter or unnecessary equipment/Purposeful proactive rounding/Room/bathroom lighting operational, light cord in reach

## 2023-10-04 NOTE — ED ADULT TRIAGE NOTE - CHIEF COMPLAINT QUOTE
pt dx with UTI 3 days ago, on antibiotics with no relief, c/o increased chills and urinary frequency no phx

## 2023-10-04 NOTE — ED PROVIDER NOTE - NS_EDPROVIDERDISPOUSERTYPE_ED_A_ED
I have personally evaluated and examined the patient. The Attending was available to me as a supervising provider if needed.
22-Apr-2022 12:15

## 2023-10-04 NOTE — ED PROVIDER NOTE - PROGRESS NOTE DETAILS
Labs, UA, chest x-ray resulted within normal, EKG NSR nonischemic and troponin less than 6.  Discussed with patient RVP positive for rhino enterovirus which is likely cause of patient's symptoms.  Discussed as urinary frequency has been ongoing since May he needs to follow-up with a urologist.  Patient can continue taking antibiotics as previously prescribed.  Patient will return to the ER for any worsening or concerning symptoms.  At time of discharge patient is well-appearing, vital stable, patient has remained afebrile while in the ED.

## 2023-10-04 NOTE — ED ADULT NURSE NOTE - OBJECTIVE STATEMENT
Patient received to wellness, a&ox4, ambulatory. t dx with UTI 3 days ago, on antibiotics with no relief, c/o urinary frequency. Pt also endorsing intermittent chest pain. Pt denies chest pain, sob, n+v, headache, dizziness, fever, chills, dysuria. Breathing even, unlabored. 20g IV placed by left ac, labs collected and sent. Pending results.

## 2023-10-04 NOTE — ED PROVIDER NOTE - CLINICAL SUMMARY MEDICAL DECISION MAKING FREE TEXT BOX
38-year-old male with no stated past medical history presenting to the ER with multiple complaints.  Patient reports since May he has had urinary frequency.  Patient states last week he developed sinus congestion and cough, states his wife had COVID 2 weeks ago.  Patient went to urgent care last week when he had symptoms of viral URI, tested positive for rhino enterovirus on 9/26 also had a UA and culture sent that showed [phone] CFU's of Enterococcus faecalis and was started on cefuroxime. Pt reports body aches, chills, did have episode of chest tightness today. On exam pt is well appearing, afebrile, no abd/suprapubic tenderness, no reports of pain with BMs. Pt with likely viral illness, long standing urinary frequency since May with no dysuria. Low risk ACS, chest tightness likely MSK with body aches. Plan: cbc/cmp, trop, RVP, CXR, UA/UCX. Discussed with pt he will need outpatient urology follow up.

## 2023-10-04 NOTE — ED PROVIDER NOTE - PATIENT PORTAL LINK FT
You can access the FollowMyHealth Patient Portal offered by [facility] by registering at the following website: [email or web address]. By joining [facility]’s FollowMyHealth portal, you will also be able to view your health information using other applications (apps) compatible with our system.

## 2023-10-04 NOTE — ED PROVIDER NOTE - NSFOLLOWUPINSTRUCTIONS_ED_ALL_ED_FT
Follow-up with your primary care doctor within 1 week  Follow-up with your urologist within 1 week, referral list attached please call to make an appointment  Drink plenty of fluids  Take Tylenol 650 mg every 6 hours as needed for body ache  Continue taking antibiotic as previously prescribed to you  Return to the ER with any worsening or concerning symptoms pain, nausea, vomiting, fever/chills, weakness or any other

## 2023-10-04 NOTE — ED PROVIDER NOTE - OBJECTIVE STATEMENT
38-year-old male with no stated past medical history presenting to the ER with multiple complaints.  Patient reports since May he has had urinary frequency.  Patient states last week he developed sinus congestion and cough, states his wife had COVID 2 weeks ago.  Patient went to urgent care last week when he had symptoms of viral URI, tested positive for rhino enterovirus on 9/26 also had a UA and culture sent that showed [phone] CFU's of Enterococcus faecalis.  Patient reports that today he had chills, body aches, right shoulder pain.  Associated he did have chest tightness.  Patient concerned that he has been on 3 days of antibiotic for UTI continues to have symptoms. 38-year-old male with no stated past medical history presenting to the ER with multiple complaints.  Patient reports since May he has had urinary frequency.  Patient states last week he developed sinus congestion and cough, states his wife had COVID 2 weeks ago.  Patient went to urgent care last week when he had symptoms of viral URI, tested positive for rhino enterovirus on 9/26 also had a UA and culture sent that showed [phone] CFU's of Enterococcus faecalis and was started on cefuroxime.  Patient reports that today he had chills, body aches, right shoulder pain.  Associated he did have chest tightness.  Patient concerned that he has been on 3 days of antibiotic for UTI continues to have symptoms. 38-year-old male with no stated past medical history presenting to the ER with multiple complaints.  Patient reports since May he has had urinary frequency.  Patient states last week he developed sinus congestion and cough, states his wife had COVID 2 weeks ago.  Patient went to urgent care last week when he had symptoms of viral URI, tested positive for rhino enterovirus on 9/26 also had a UA and culture sent that showed [phone] CFU's of Enterococcus faecalis and was started on cefuroxime.  Patient reports that today he had chills, body aches, right shoulder pain.  Associated he did have chest tightness.  Patient concerned that he has been on 3 days of antibiotic for UTI continues to have symptoms. Pt denies abd pain, n/v/d, dysuria, rectal pain with bowel movements, cp, sob, weakness, dizziness, back pain, cough, palpitations, recent travel or any other concerns.

## 2023-10-04 NOTE — ED ADULT NURSE NOTE - SUICIDE SCREENING QUESTION 1
"No

## 2023-10-06 LAB
CULTURE RESULTS: NO GROWTH — SIGNIFICANT CHANGE UP
SPECIMEN SOURCE: SIGNIFICANT CHANGE UP

## 2024-05-07 NOTE — H&P PST ADULT - VENOUS THROMBOEMBOLISM CURRENT STATUS
Quality 431: Preventive Care And Screening: Unhealthy Alcohol Use - Screening: Patient not identified as an unhealthy alcohol user when screened for unhealthy alcohol use using a systematic screening method Quality 130: Documentation Of Current Medications In The Medical Record: Current Medications Documented Detail Level: Detailed Quality 47: Advance Care Plan: Advance care planning not documented, reason not otherwise specified. Quality 226: Preventive Care And Screening: Tobacco Use: Screening And Cessation Intervention: Patient screened for tobacco use and is an ex/non-smoker (0) indicator not present

## 2024-06-17 NOTE — ASU PREOP CHECKLIST - NS PREOP CHK TEST_COVID RESULT_GEN_ALL_CORE
[Core Needle Biopsy] : core needle biopsy ~T ~C was performed  [Area of Mass: ______] : mass identified in the [unfilled] [Patient] : the patient [Risks] : risks [Consent Obtained] : written consent was obtained prior to the procedure and is detailed in the patient's record [___ ml Inj] : [unfilled] ~Uml [1%] : 1% [With Epi] : with epinephrine [Supine] : the patient was placed in the supine position with the neck extended as tolerated [Betadine] : with betadine solution [ATEC 9 Gauge Needle] : ATEC 9 gauge needle was used [Clip Placement ___] : Clip placement [unfilled] [1 Pass] : 1 pass was made through the mass [Ultrasonic Guidance] : ultrasound guidance was employed [Sent to Histology] : the specimens were prepared in the usual manner and sent to cytopathologist [Tolerated Well] : the patient tolerated the procedure well [No Complications] : there were no complications [Instructions Given] : handouts/patient instructions were given to patient [___ Month(s)] : in [unfilled] month(s) Negative

## 2024-09-27 NOTE — ASU PATIENT PROFILE, ADULT - ATTEMPT TO OOB
Patient is here alone today.        If any information or results need to be relayed from today's visit, the best way to contact the patient is via [phone] (mobile) - Patient gives verbal permission to leave a detailed voicemail at the number provided.       no

## 2024-11-20 NOTE — H&P PST ADULT - PULMONARY EMBOLUS
"  Name: Tashi Alexander      : 1957      MRN: [id number]  Encounter Provider: Blaze Levy DPM  Encounter Date: 2024   Encounter department: [facility] [place]    Assessment & Plan     1. Ingrown nail of great toe of left foot  2. Pain of right great toe  -     Ambulatory Referral to Podiatry  -     Nail removal  3. Pain of left great toe  -     Ambulatory Referral to Podiatry  -     Nail removal  4. Ingrown nail of great toe of right foot      Medial and lateral border of the right great toe as well as medial and lateral borders of the left great toe nail avulsion today.    Nail removal    Date/Time: 2024 8:45 AM    Performed by: Blaze Levy DPM  Authorized by: Blaze Levy DPM    Patient location:  Clinic  Indications / Diagnosis:  Ingrown nail  Universal Protocol:  procedure performed by consultantConsent: Verbal consent obtained.  Risks and benefits: risks, benefits and alternatives were discussed  Consent given by: patient  Time out: Immediately prior to procedure a \"time out\" was called to verify the correct patient, procedure, equipment, support staff and site/side marked as required.  Patient understanding: patient states understanding of the procedure being performed    Location:     Foot:  R big toe  Pre-procedure details:     Skin preparation:  Betadine    Preparation: Patient was prepped and draped in the usual sterile fashion    Anesthesia (see MAR for exact dosages):     Anesthesia method:  Nerve block    Block needle gauge:  25 G    Block anesthetic:  Lidocaine 1% w/o epi    Block technique:  Digital Block    Block outcome:  Anesthesia achieved  Nail Removal:     Nail removed:  Partial    Nail bed sutured: no    Post-procedure details:     Dressing:  4x4 sterile gauze, antibiotic ointment, gauze roll and petrolatum-impregnated gauze    Patient tolerance of procedure:  Tolerated well, no immediate complications  Comments:      Discussion " "with patient was completed today regarding diagnosis and potential etiologies as well as treatment options for this ingrown nail diagnosis.  Discussed how to avoid recurrence and possible treatment options if recurrence does occur.    Antibiotic ointment applied to border with bandage dressing  Pt instructed to keep dressing intact for 24 hours.  After this time use triple antibiotic ointment to the area and a dry dressing changed daily  Return to clinic in about 3 weeks for reevaluation.  If ingrown nail recurs can consider use of phenol at nail matrix.  If notice any redness, swelling, drainage, or excessive pain or signs of infection to notify the office sooner.  Procedure completed without incident.  Do not soak foot.    Procedure in detail: Once the toe was anesthetized, the area was scrubbed and prepped with sterile technique.  A hemostat was used to lift up the involved border of the great toe.  Care was taken to protect the underlying nail bed.  An English anvil was used to cut back corner to the base of the nail.  A hemostat was then used to remove the nail that was ingrown.  This was then checked that the entire ingrown portion was removed.  This was removed from the operative area.  Hemostasis was achieved and dressings were applied.   Nail removal    Date/Time: 11/20/2024 8:45 AM    Performed by: Blaze Levy DPM  Authorized by: Blaze Levy DPM    Patient location:  Clinic  Indications / Diagnosis:  Ingrown nail  Universal Protocol:  procedure performed by consultantConsent: Verbal consent obtained.  Risks and benefits: risks, benefits and alternatives were discussed  Consent given by: patient  Time out: Immediately prior to procedure a \"time out\" was called to verify the correct patient, procedure, equipment, support staff and site/side marked as required.  Patient understanding: patient states understanding of the procedure being performed    Location:     Foot:  L big " toe  Pre-procedure details:     Skin preparation:  Betadine    Preparation: Patient was prepped and draped in the usual sterile fashion    Anesthesia (see MAR for exact dosages):     Anesthesia method:  Nerve block    Block needle gauge:  25 G    Block anesthetic:  Lidocaine 1% w/o epi    Block technique:  Digital Block    Block outcome:  Anesthesia achieved  Nail Removal:     Nail removed:  Partial    Nail bed sutured: no    Post-procedure details:     Dressing:  4x4 sterile gauze, antibiotic ointment, gauze roll and petrolatum-impregnated gauze    Patient tolerance of procedure:  Tolerated well, no immediate complications  Comments:      Discussion with patient was completed today regarding diagnosis and potential etiologies as well as treatment options for this ingrown nail diagnosis.  Discussed how to avoid recurrence and possible treatment options if recurrence does occur.    Antibiotic ointment applied to border with bandage dressing  Pt instructed to keep dressing intact for 24 hours.  After this time use triple antibiotic ointment to the area and a dry dressing changed daily  Return to clinic in about 3 weeks for reevaluation.  If ingrown nail recurs can consider use of phenol at nail matrix.  If notice any redness, swelling, drainage, or excessive pain or signs of infection to notify the office sooner.  Procedure completed without incident.  Do not soak foot.    Procedure in detail: Once the toe was anesthetized, the area was scrubbed and prepped with sterile technique.  A hemostat was used to lift up the involved border of the great toe.  Care was taken to protect the underlying nail bed.  An English anvil was used to cut back corner to the base of the nail.  A hemostat was then used to remove the nail that was ingrown.  This was then checked that the entire ingrown portion was removed.  This was removed from the operative area.  Hemostasis was achieved and dressings were applied.         Return in about 3  "weeks (around 12/11/2024).    Subjective     Location: bilateral great toes  Type of pain: dull sensation at the distal tip of the toe where the nail ends. Discomfort.  Duration and Onset: years  Aggravating factors: on feet a lot makes worse.    Treatment so far: changed shoes, Hookas seem to work better.         Constitutional:  Negative for chills and fever.   Respiratory:  Negative for chest tightness and shortness of breath.    Gastrointestinal:  Negative for nausea and vomiting.     Current Outpatient Medications on File Prior to Visit   Medication Sig   • Azelastine HCl 137 MCG/SPRAY SOLN USE 1 SPRAY INTO EACH NOSTRIL TWICE A DAY   • naproxen (NAPROSYN) 500 mg tablet Take 1 tablet (500 mg total) by mouth 2 (two) times a day as needed for moderate pain   • tadalafil (CIALIS) 20 MG tablet Take 1 tablet (20 mg total) by mouth daily as needed for erectile dysfunction   • tirzepatide (Zepbound) 12.5 mg/0.5 mL auto-injector Inject 0.5 mL (12.5 mg total) under the skin once a week       Objective     Ht 5' 9\" (1.753 m)   Wt 120 kg (264 lb)   BMI 38.99 kg/m²     Pain on palpation noted to the medial and lateral borders right great toe and medial and lateral borders of left great toe.  There is discomfort with medial lateral squeeze at the level of the great toe.   No signs of infection. Intact pedal pulses.  Neurological sensation is grossly intact distally. No tenderness other areas of foot or ankle. No other open lesions or ulcerations noted currently.     " no

## 2025-03-15 NOTE — ED ADULT NURSE NOTE - CHIEF COMPLAINT QUOTE
2 days ago noticed small cyst on R side of scalp - HX cysts - area is raised reddened with skin intact - no other symptoms today
No (0)

## 2025-06-17 ENCOUNTER — EMERGENCY (EMERGENCY)
Facility: HOSPITAL | Age: 40
LOS: 1 days | End: 2025-06-17
Attending: EMERGENCY MEDICINE
Payer: COMMERCIAL

## 2025-06-17 VITALS
RESPIRATION RATE: 16 BRPM | SYSTOLIC BLOOD PRESSURE: 127 MMHG | WEIGHT: 197.09 LBS | DIASTOLIC BLOOD PRESSURE: 66 MMHG | HEART RATE: 84 BPM | OXYGEN SATURATION: 98 % | TEMPERATURE: 98 F

## 2025-06-17 VITALS
HEART RATE: 86 BPM | OXYGEN SATURATION: 97 % | SYSTOLIC BLOOD PRESSURE: 118 MMHG | TEMPERATURE: 98 F | DIASTOLIC BLOOD PRESSURE: 69 MMHG | RESPIRATION RATE: 18 BRPM

## 2025-06-17 DIAGNOSIS — Z98.890 OTHER SPECIFIED POSTPROCEDURAL STATES: Chronic | ICD-10-CM

## 2025-06-17 DIAGNOSIS — M21.612 BUNION OF LEFT FOOT: Chronic | ICD-10-CM

## 2025-06-17 LAB
ALBUMIN SERPL ELPH-MCNC: 4.2 G/DL — SIGNIFICANT CHANGE UP (ref 3.5–5)
ALP SERPL-CCNC: 73 U/L — SIGNIFICANT CHANGE UP (ref 40–120)
ALT FLD-CCNC: 41 U/L DA — SIGNIFICANT CHANGE UP (ref 10–60)
ANION GAP SERPL CALC-SCNC: 1 MMOL/L — LOW (ref 5–17)
APPEARANCE UR: ABNORMAL
AST SERPL-CCNC: 21 U/L — SIGNIFICANT CHANGE UP (ref 10–40)
BACTERIA # UR AUTO: ABNORMAL /HPF
BASOPHILS # BLD AUTO: 0.04 K/UL — SIGNIFICANT CHANGE UP (ref 0–0.2)
BASOPHILS NFR BLD AUTO: 0.6 % — SIGNIFICANT CHANGE UP (ref 0–2)
BILIRUB SERPL-MCNC: 0.5 MG/DL — SIGNIFICANT CHANGE UP (ref 0.2–1.2)
BILIRUB UR-MCNC: ABNORMAL
BUN SERPL-MCNC: 12 MG/DL — SIGNIFICANT CHANGE UP (ref 7–18)
CALCIUM SERPL-MCNC: 9.1 MG/DL — SIGNIFICANT CHANGE UP (ref 8.4–10.5)
CHLORIDE SERPL-SCNC: 105 MMOL/L — SIGNIFICANT CHANGE UP (ref 96–108)
CK SERPL-CCNC: 83 U/L — SIGNIFICANT CHANGE UP (ref 35–232)
CO2 SERPL-SCNC: 30 MMOL/L — SIGNIFICANT CHANGE UP (ref 22–31)
COLOR SPEC: ABNORMAL
CREAT SERPL-MCNC: 0.97 MG/DL — SIGNIFICANT CHANGE UP (ref 0.5–1.3)
DIFF PNL FLD: ABNORMAL
EGFR: 101 ML/MIN/1.73M2 — SIGNIFICANT CHANGE UP
EGFR: 101 ML/MIN/1.73M2 — SIGNIFICANT CHANGE UP
EOSINOPHIL # BLD AUTO: 0.1 K/UL — SIGNIFICANT CHANGE UP (ref 0–0.5)
EOSINOPHIL NFR BLD AUTO: 1.5 % — SIGNIFICANT CHANGE UP (ref 0–6)
FLUAV AG NPH QL: SIGNIFICANT CHANGE UP
FLUBV AG NPH QL: SIGNIFICANT CHANGE UP
GLUCOSE SERPL-MCNC: 105 MG/DL — HIGH (ref 70–99)
GLUCOSE UR QL: NEGATIVE MG/DL — SIGNIFICANT CHANGE UP
HCT VFR BLD CALC: 44.4 % — SIGNIFICANT CHANGE UP (ref 39–50)
HCV AB S/CO SERPL IA: 0.09 S/CO — SIGNIFICANT CHANGE UP (ref 0–0.79)
HCV AB SERPL-IMP: SIGNIFICANT CHANGE UP
HGB BLD-MCNC: 15.3 G/DL — SIGNIFICANT CHANGE UP (ref 13–17)
HIV 1 & 2 AB SERPL IA.RAPID: SIGNIFICANT CHANGE UP
IMM GRANULOCYTES NFR BLD AUTO: 0.4 % — SIGNIFICANT CHANGE UP (ref 0–0.9)
KETONES UR QL: NEGATIVE MG/DL — SIGNIFICANT CHANGE UP
LEUKOCYTE ESTERASE UR-ACNC: ABNORMAL
LIDOCAIN IGE QN: 32 U/L — SIGNIFICANT CHANGE UP (ref 13–75)
LYMPHOCYTES # BLD AUTO: 1.35 K/UL — SIGNIFICANT CHANGE UP (ref 1–3.3)
LYMPHOCYTES # BLD AUTO: 20 % — SIGNIFICANT CHANGE UP (ref 13–44)
MAGNESIUM SERPL-MCNC: 1.9 MG/DL — SIGNIFICANT CHANGE UP (ref 1.6–2.6)
MCHC RBC-ENTMCNC: 29.6 PG — SIGNIFICANT CHANGE UP (ref 27–34)
MCHC RBC-ENTMCNC: 34.5 G/DL — SIGNIFICANT CHANGE UP (ref 32–36)
MCV RBC AUTO: 85.9 FL — SIGNIFICANT CHANGE UP (ref 80–100)
MONOCYTES # BLD AUTO: 0.58 K/UL — SIGNIFICANT CHANGE UP (ref 0–0.9)
MONOCYTES NFR BLD AUTO: 8.6 % — SIGNIFICANT CHANGE UP (ref 2–14)
NEUTROPHILS # BLD AUTO: 4.64 K/UL — SIGNIFICANT CHANGE UP (ref 1.8–7.4)
NEUTROPHILS NFR BLD AUTO: 68.9 % — SIGNIFICANT CHANGE UP (ref 43–77)
NITRITE UR-MCNC: NEGATIVE — SIGNIFICANT CHANGE UP
NRBC BLD AUTO-RTO: 0 /100 WBCS — SIGNIFICANT CHANGE UP (ref 0–0)
PH UR: 8 — SIGNIFICANT CHANGE UP (ref 5–8)
PHOSPHATE SERPL-MCNC: 2.7 MG/DL — SIGNIFICANT CHANGE UP (ref 2.5–4.5)
PLATELET # BLD AUTO: 207 K/UL — SIGNIFICANT CHANGE UP (ref 150–400)
POTASSIUM SERPL-MCNC: 3.8 MMOL/L — SIGNIFICANT CHANGE UP (ref 3.5–5.3)
POTASSIUM SERPL-SCNC: 3.8 MMOL/L — SIGNIFICANT CHANGE UP (ref 3.5–5.3)
PROT SERPL-MCNC: 7.5 G/DL — SIGNIFICANT CHANGE UP (ref 6–8.3)
PROT UR-MCNC: 30 MG/DL
RBC # BLD: 5.17 M/UL — SIGNIFICANT CHANGE UP (ref 4.2–5.8)
RBC # FLD: 12.1 % — SIGNIFICANT CHANGE UP (ref 10.3–14.5)
RBC CASTS # UR COMP ASSIST: >50 /HPF — HIGH (ref 0–4)
RSV RNA NPH QL NAA+NON-PROBE: SIGNIFICANT CHANGE UP
SARS-COV-2 RNA SPEC QL NAA+PROBE: SIGNIFICANT CHANGE UP
SODIUM SERPL-SCNC: 136 MMOL/L — SIGNIFICANT CHANGE UP (ref 135–145)
SOURCE RESPIRATORY: SIGNIFICANT CHANGE UP
SP GR SPEC: 1.02 — SIGNIFICANT CHANGE UP (ref 1–1.03)
UROBILINOGEN FLD QL: 0.2 MG/DL — SIGNIFICANT CHANGE UP (ref 0.2–1)
WBC # BLD: 6.74 K/UL — SIGNIFICANT CHANGE UP (ref 3.8–10.5)
WBC # FLD AUTO: 6.74 K/UL — SIGNIFICANT CHANGE UP (ref 3.8–10.5)
WBC UR QL: 10 /HPF — HIGH (ref 0–5)

## 2025-06-17 PROCEDURE — 96375 TX/PRO/DX INJ NEW DRUG ADDON: CPT

## 2025-06-17 PROCEDURE — 74177 CT ABD & PELVIS W/CONTRAST: CPT | Mod: 26

## 2025-06-17 PROCEDURE — 85025 COMPLETE CBC W/AUTO DIFF WBC: CPT

## 2025-06-17 PROCEDURE — 36415 COLL VENOUS BLD VENIPUNCTURE: CPT

## 2025-06-17 PROCEDURE — 99285 EMERGENCY DEPT VISIT HI MDM: CPT

## 2025-06-17 PROCEDURE — 84100 ASSAY OF PHOSPHORUS: CPT

## 2025-06-17 PROCEDURE — 87637 SARSCOV2&INF A&B&RSV AMP PRB: CPT

## 2025-06-17 PROCEDURE — 74177 CT ABD & PELVIS W/CONTRAST: CPT

## 2025-06-17 PROCEDURE — 99284 EMERGENCY DEPT VISIT MOD MDM: CPT | Mod: 25

## 2025-06-17 PROCEDURE — 83735 ASSAY OF MAGNESIUM: CPT

## 2025-06-17 PROCEDURE — 81001 URINALYSIS AUTO W/SCOPE: CPT

## 2025-06-17 PROCEDURE — 96374 THER/PROPH/DIAG INJ IV PUSH: CPT | Mod: XU

## 2025-06-17 PROCEDURE — 87086 URINE CULTURE/COLONY COUNT: CPT

## 2025-06-17 PROCEDURE — 83690 ASSAY OF LIPASE: CPT

## 2025-06-17 PROCEDURE — 82550 ASSAY OF CK (CPK): CPT

## 2025-06-17 PROCEDURE — 86703 HIV-1/HIV-2 1 RESULT ANTBDY: CPT

## 2025-06-17 PROCEDURE — 80053 COMPREHEN METABOLIC PANEL: CPT

## 2025-06-17 PROCEDURE — 86803 HEPATITIS C AB TEST: CPT

## 2025-06-17 RX ORDER — ONDANSETRON HCL/PF 4 MG/2 ML
1 VIAL (ML) INJECTION
Qty: 30 | Refills: 0
Start: 2025-06-17 | End: 2025-06-21

## 2025-06-17 RX ORDER — KETOROLAC TROMETHAMINE 30 MG/ML
15 INJECTION, SOLUTION INTRAMUSCULAR; INTRAVENOUS ONCE
Refills: 0 | Status: DISCONTINUED | OUTPATIENT
Start: 2025-06-17 | End: 2025-06-17

## 2025-06-17 RX ORDER — TAMSULOSIN HYDROCHLORIDE 0.4 MG/1
0.4 CAPSULE ORAL ONCE
Refills: 0 | Status: COMPLETED | OUTPATIENT
Start: 2025-06-17 | End: 2025-06-17

## 2025-06-17 RX ORDER — TAMSULOSIN HYDROCHLORIDE 0.4 MG/1
1 CAPSULE ORAL
Qty: 30 | Refills: 0
Start: 2025-06-17 | End: 2025-07-16

## 2025-06-17 RX ORDER — ONDANSETRON HCL/PF 4 MG/2 ML
4 VIAL (ML) INJECTION ONCE
Refills: 0 | Status: COMPLETED | OUTPATIENT
Start: 2025-06-17 | End: 2025-06-17

## 2025-06-17 RX ORDER — KETOROLAC TROMETHAMINE 30 MG/ML
1 INJECTION, SOLUTION INTRAMUSCULAR; INTRAVENOUS
Qty: 15 | Refills: 0
Start: 2025-06-17 | End: 2025-06-21

## 2025-06-17 RX ADMIN — Medication 4 MILLIGRAM(S): at 11:59

## 2025-06-17 RX ADMIN — Medication 1000 MILLILITER(S): at 09:29

## 2025-06-17 RX ADMIN — KETOROLAC TROMETHAMINE 15 MILLIGRAM(S): 30 INJECTION, SOLUTION INTRAMUSCULAR; INTRAVENOUS at 09:28

## 2025-06-17 RX ADMIN — KETOROLAC TROMETHAMINE 15 MILLIGRAM(S): 30 INJECTION, SOLUTION INTRAMUSCULAR; INTRAVENOUS at 11:50

## 2025-06-17 RX ADMIN — TAMSULOSIN HYDROCHLORIDE 0.4 MILLIGRAM(S): 0.4 CAPSULE ORAL at 11:59

## 2025-06-17 NOTE — ED PROVIDER NOTE - OBJECTIVE STATEMENT
39 male with hx of bipolar & prior kidney stones.   Patient presenting to the ED reporting left flank pain.  Patient reports he was having chills & body aches over the weekend.  Developed left flank pain last night with mild hematuria/pink urine this morning.

## 2025-06-17 NOTE — ED PROVIDER NOTE - PATIENT PORTAL LINK FT
You can access the FollowMyHealth Patient Portal offered by [facility] by registering at the following website: [email or web address]. By joining [facility]’s FollowMyHealth portal, you will also be able to view your health information using other applications (apps) compatible with our system.

## 2025-06-17 NOTE — ED PROVIDER NOTE - PHYSICAL EXAMINATION
Gen:  Awake, alert, NAD, WDWN, NCAT, non-toxic appearing.   Eyes:  PERRL, EOMI, no icterus, normal lids/lashes, normal conjunctivae.  ENT:  External inspection normal, pink/moist membranes.   CV:  S1S2, regular rate and rhythm, no murmur/gallops/rubs, no JVD, 2+ pulses b/l, no edema/cords/homans, good capillary refill, warm/well-perfused.  Resp:  Normal respiratory rate/effort, no respiratory distress, normal voice, speaking full sentences, lungs clear to auscultation b/l, no wheezing/rales/rhonchi, no retractions, no stridor.  Abd:  Soft abdomen, mild LLQ tender, no distended/guarding/rebound, no pulsatile mass, no CVA tender.   Musculoskeletal:  N/V intact, FROM all 4 extremities, normal motor tone, stable gait.   Neck:  FROM neck, supple, trachea midline, no meningismus.   Skin:  Color normal for race, warm and dry, no rash.  Neuro:  Oriented x3, CN 2-12 intact (grossly), normal motor (grossly), normal sensory (grossly), normal gait. GCS 15  Psych:  Attention normal. Affect normal. Behavior normal. Judgment normal.

## 2025-06-17 NOTE — ED PROVIDER NOTE - CLINICAL SUMMARY MEDICAL DECISION MAKING FREE TEXT BOX
39 male with hx of bipolar & prior kidney stones.   Patient presenting to the ED reporting left flank pain.  Patient reports he was having chills & body aches over the weekend.  Developed left flank pain last night with mild hematuria/pink urine this morning.    Vitals stable.  Nontoxic appearing, n/v intact.  Airway intact, no respiratory distress, no hypoxia.  Mild LLQ abdominal tenderness.  No CVA tenderness.    Plan to obtain:  - Labs, CT (R/O kidney stone, pyelonephritis, diverticulitis, abscess, or perforation), IV fluids, antipyretics, analgesia, & antiemetics as needed, observe/reassess    ED Course:  Lab values demonstrate no acute/emergent pathology.  My independent interpretation of the EKG: Sinus @ [], normal axis, normal intervals, normal ST/T  My independent interpretation of XR: [No consolidation/effusion/pntx.]    [***]    [Patient advised regarding need for close outpatient follow up.  Patient stable for further care in outpatient setting. No indication for inpatient admission at this time. Patient advised regarding symptomatic & supportive care and symptoms to prompt ED return. Strict return precautions provided.]    [Patient requires inpatient admission for further care & stabilization. Care signed out to inpatient team.] 39 male with hx of bipolar & prior kidney stones.   Patient presenting to the ED reporting left flank pain.  Patient reports he was having chills & body aches over the weekend.  Developed left flank pain last night with mild hematuria/pink urine this morning.    Vitals stable.  Nontoxic appearing, n/v intact.  Airway intact, no respiratory distress, no hypoxia.  Mild LLQ abdominal tenderness.  No CVA tenderness.    Plan to obtain:  - Labs, CT (R/O kidney stone, pyelonephritis, diverticulitis, abscess, or perforation), IV fluids, antipyretics, analgesia, & antiemetics as needed, observe/reassess    ED Course:  Lab values demonstrate no acute/emergent pathology. Renal function ok. No evidence of UTI  CT showing proximal ureter stones.   Analgesia, antiemetics, & tamsulosing given.   Symptoms improved. Patient well-appearing.  Tolerating PO intake in ED. No abdominal tenderness on reassessment.  Patient advised regarding need for close outpatient follow up.  Patient stable for further care in outpatient setting. No indication for inpatient admission at this time. Patient advised regarding symptomatic & supportive care and symptoms to prompt ED return. Strict return precautions provided.

## 2025-06-17 NOTE — ED PROVIDER NOTE - NS ED ROS FT
Constitutional: (-) fever (+) chills (+) malaise  HENT: (-) congestion (-) rhinorrhea (-) sore throat  Eyes: (-) pain (-) redness  Respiratory: (-) cough (-) shortness of breath (-) wheezing (-) stridor  Cardiovascular: (-) chest pain (-) palpitations (-) leg swelling  Gastrointestinal: (-) abdominal pain (-) blood in stool (no melena/hematochezia) (-) diarrhea (-) vomiting  Genitourinary: (-) dysuria (+) hematuria  Musculoskeletal: (-) gait problem (-) joint swelling (+) myalgias  Skin: (-) color change (-) rash  Neurological: (-) weakness (-) numbness (-) headaches  Psychiatric/Behavioral: (-) confusion

## 2025-06-17 NOTE — ED PROVIDER NOTE - CCCP TRG CHIEF CMPLNT
dark colored urine/pain, flank
NSR at 104 bpm. Normal axis, normal intervals excpet for 1st deg AV block with NV of 210ms, no acute ischemic changes. Minimal voltage criteria for LVH

## 2025-06-17 NOTE — ED PROVIDER NOTE - NSFOLLOWUPCLINICS_GEN_ALL_ED_FT
[facility]  Internal Medicine  [address] [place] [address]  Phone: [phone]  Fax: [phone]  Follow Up Time: 1-3 Days    [facility]  Urology  [address] [place] [address]  Phone: [phone]  Fax: [phone]  Follow Up Time: 4-6 Days

## 2025-06-17 NOTE — ED PROVIDER NOTE - NSFOLLOWUPINSTRUCTIONS_ED_ALL_ED_FT
Please follow up with your PMD or Medicine Clinic in 2-3 days.  Follow up with Urology in 1 week.  Return to the ER for worsening or concerning symptoms.  Your results for testing will be available in the Follow My Health application which can be downloaded to your phone or checked online on a computer.  [email or web address]  See attached printed discharge papers for further information.  Drink plenty of fluids or an oral rehydration solution like Pedialyte, Gatorade, or Powerade.  Take Acetaminophen (Tylenol) 650mg every 6 hours as needed for pain or fever.  Take Ketorolac (Toradol) 10mg every 8 hours as needed for pain with food.  Take Tamsulosin (Flomax) 0.4mg daily.   Take Ondansetron (Zofran) 4mg every 6 hours as needed for nausea or vomiting.

## 2025-06-17 NOTE — ED ADULT NURSE NOTE - OBJECTIVE STATEMENT
Patient came to the ED a/o x 3 ambulates c/o L sided flank pain x Thursday. + chills/ nausea. Pt states the pain radiates to the  R sided abdominal area.

## 2025-06-18 LAB
CULTURE RESULTS: SIGNIFICANT CHANGE UP
SPECIMEN SOURCE: SIGNIFICANT CHANGE UP

## 2025-06-30 ENCOUNTER — APPOINTMENT (OUTPATIENT)
Dept: SURGERY | Facility: CLINIC | Age: 40
End: 2025-06-30

## 2025-07-18 ENCOUNTER — EMERGENCY (EMERGENCY)
Facility: HOSPITAL | Age: 40
LOS: 1 days | End: 2025-07-18
Attending: STUDENT IN AN ORGANIZED HEALTH CARE EDUCATION/TRAINING PROGRAM
Payer: COMMERCIAL

## 2025-07-18 DIAGNOSIS — M21.612 BUNION OF LEFT FOOT: Chronic | ICD-10-CM

## 2025-07-18 DIAGNOSIS — Z98.890 OTHER SPECIFIED POSTPROCEDURAL STATES: Chronic | ICD-10-CM

## 2025-07-18 PROCEDURE — 99285 EMERGENCY DEPT VISIT HI MDM: CPT | Mod: 25

## 2025-07-19 VITALS
OXYGEN SATURATION: 96 % | SYSTOLIC BLOOD PRESSURE: 112 MMHG | RESPIRATION RATE: 19 BRPM | DIASTOLIC BLOOD PRESSURE: 72 MMHG | TEMPERATURE: 98 F | HEART RATE: 63 BPM

## 2025-07-19 VITALS
SYSTOLIC BLOOD PRESSURE: 109 MMHG | DIASTOLIC BLOOD PRESSURE: 74 MMHG | OXYGEN SATURATION: 97 % | HEIGHT: 71 IN | RESPIRATION RATE: 18 BRPM | HEART RATE: 98 BPM | WEIGHT: 195.11 LBS | TEMPERATURE: 98 F

## 2025-07-19 LAB
ALBUMIN SERPL ELPH-MCNC: 3.8 G/DL — SIGNIFICANT CHANGE UP (ref 3.5–5)
ALP SERPL-CCNC: 53 U/L — SIGNIFICANT CHANGE UP (ref 40–120)
ALT FLD-CCNC: 34 U/L DA — SIGNIFICANT CHANGE UP (ref 10–60)
ANION GAP SERPL CALC-SCNC: 2 MMOL/L — LOW (ref 5–17)
APPEARANCE UR: CLEAR — SIGNIFICANT CHANGE UP
AST SERPL-CCNC: 22 U/L — SIGNIFICANT CHANGE UP (ref 10–40)
BACTERIA # UR AUTO: ABNORMAL /HPF
BASOPHILS # BLD AUTO: 0.03 K/UL — SIGNIFICANT CHANGE UP (ref 0–0.2)
BASOPHILS NFR BLD AUTO: 0.4 % — SIGNIFICANT CHANGE UP (ref 0–2)
BILIRUB SERPL-MCNC: 0.6 MG/DL — SIGNIFICANT CHANGE UP (ref 0.2–1.2)
BILIRUB UR-MCNC: NEGATIVE — SIGNIFICANT CHANGE UP
BUN SERPL-MCNC: 14 MG/DL — SIGNIFICANT CHANGE UP (ref 7–18)
CALCIUM SERPL-MCNC: 8.9 MG/DL — SIGNIFICANT CHANGE UP (ref 8.4–10.5)
CHLORIDE SERPL-SCNC: 105 MMOL/L — SIGNIFICANT CHANGE UP (ref 96–108)
CO2 SERPL-SCNC: 32 MMOL/L — HIGH (ref 22–31)
COLOR SPEC: YELLOW — SIGNIFICANT CHANGE UP
CREAT SERPL-MCNC: 1.47 MG/DL — HIGH (ref 0.5–1.3)
DIFF PNL FLD: ABNORMAL
EGFR: 61 ML/MIN/1.73M2 — SIGNIFICANT CHANGE UP
EGFR: 61 ML/MIN/1.73M2 — SIGNIFICANT CHANGE UP
EOSINOPHIL # BLD AUTO: 0.1 K/UL — SIGNIFICANT CHANGE UP (ref 0–0.5)
EOSINOPHIL NFR BLD AUTO: 1.5 % — SIGNIFICANT CHANGE UP (ref 0–6)
EPI CELLS # UR: PRESENT
GLUCOSE SERPL-MCNC: 107 MG/DL — HIGH (ref 70–99)
GLUCOSE UR QL: NEGATIVE MG/DL — SIGNIFICANT CHANGE UP
HCT VFR BLD CALC: 38.9 % — LOW (ref 39–50)
HGB BLD-MCNC: 13.1 G/DL — SIGNIFICANT CHANGE UP (ref 13–17)
IMM GRANULOCYTES NFR BLD AUTO: 0.3 % — SIGNIFICANT CHANGE UP (ref 0–0.9)
KETONES UR QL: ABNORMAL MG/DL
LACTATE SERPL-SCNC: 0.9 MMOL/L — SIGNIFICANT CHANGE UP (ref 0.7–2)
LEUKOCYTE ESTERASE UR-ACNC: NEGATIVE — SIGNIFICANT CHANGE UP
LIDOCAIN IGE QN: 32 U/L — SIGNIFICANT CHANGE UP (ref 13–75)
LYMPHOCYTES # BLD AUTO: 1.5 K/UL — SIGNIFICANT CHANGE UP (ref 1–3.3)
LYMPHOCYTES # BLD AUTO: 22.4 % — SIGNIFICANT CHANGE UP (ref 13–44)
MCHC RBC-ENTMCNC: 29.2 PG — SIGNIFICANT CHANGE UP (ref 27–34)
MCHC RBC-ENTMCNC: 33.7 G/DL — SIGNIFICANT CHANGE UP (ref 32–36)
MCV RBC AUTO: 86.8 FL — SIGNIFICANT CHANGE UP (ref 80–100)
MONOCYTES # BLD AUTO: 0.65 K/UL — SIGNIFICANT CHANGE UP (ref 0–0.9)
MONOCYTES NFR BLD AUTO: 9.7 % — SIGNIFICANT CHANGE UP (ref 2–14)
NEUTROPHILS # BLD AUTO: 4.41 K/UL — SIGNIFICANT CHANGE UP (ref 1.8–7.4)
NEUTROPHILS NFR BLD AUTO: 65.7 % — SIGNIFICANT CHANGE UP (ref 43–77)
NITRITE UR-MCNC: NEGATIVE — SIGNIFICANT CHANGE UP
NRBC BLD AUTO-RTO: 0 /100 WBCS — SIGNIFICANT CHANGE UP (ref 0–0)
PH UR: 6 — SIGNIFICANT CHANGE UP (ref 5–8)
PLATELET # BLD AUTO: 181 K/UL — SIGNIFICANT CHANGE UP (ref 150–400)
POTASSIUM SERPL-MCNC: 4 MMOL/L — SIGNIFICANT CHANGE UP (ref 3.5–5.3)
POTASSIUM SERPL-SCNC: 4 MMOL/L — SIGNIFICANT CHANGE UP (ref 3.5–5.3)
PROT SERPL-MCNC: 6.2 G/DL — SIGNIFICANT CHANGE UP (ref 6–8.3)
PROT UR-MCNC: NEGATIVE MG/DL — SIGNIFICANT CHANGE UP
RBC # BLD: 4.48 M/UL — SIGNIFICANT CHANGE UP (ref 4.2–5.8)
RBC # FLD: 12.8 % — SIGNIFICANT CHANGE UP (ref 10.3–14.5)
RBC CASTS # UR COMP ASSIST: ABNORMAL /HPF
SODIUM SERPL-SCNC: 139 MMOL/L — SIGNIFICANT CHANGE UP (ref 135–145)
SP GR SPEC: 1.02 — SIGNIFICANT CHANGE UP (ref 1–1.03)
UROBILINOGEN FLD QL: 1 MG/DL — SIGNIFICANT CHANGE UP (ref 0.2–1)
WBC # BLD: 6.71 K/UL — SIGNIFICANT CHANGE UP (ref 3.8–10.5)
WBC # FLD AUTO: 6.71 K/UL — SIGNIFICANT CHANGE UP (ref 3.8–10.5)
WBC UR QL: 0 /HPF — SIGNIFICANT CHANGE UP (ref 0–5)

## 2025-07-19 PROCEDURE — 83690 ASSAY OF LIPASE: CPT

## 2025-07-19 PROCEDURE — 80053 COMPREHEN METABOLIC PANEL: CPT

## 2025-07-19 PROCEDURE — 74177 CT ABD & PELVIS W/CONTRAST: CPT | Mod: 26

## 2025-07-19 PROCEDURE — 36415 COLL VENOUS BLD VENIPUNCTURE: CPT

## 2025-07-19 PROCEDURE — 85025 COMPLETE CBC W/AUTO DIFF WBC: CPT

## 2025-07-19 PROCEDURE — 96374 THER/PROPH/DIAG INJ IV PUSH: CPT | Mod: XU

## 2025-07-19 PROCEDURE — 83605 ASSAY OF LACTIC ACID: CPT

## 2025-07-19 PROCEDURE — 74177 CT ABD & PELVIS W/CONTRAST: CPT

## 2025-07-19 PROCEDURE — 99284 EMERGENCY DEPT VISIT MOD MDM: CPT | Mod: 25

## 2025-07-19 PROCEDURE — 81001 URINALYSIS AUTO W/SCOPE: CPT

## 2025-07-19 RX ORDER — SULFAMETHOXAZOLE/TRIMETHOPRIM 800-160 MG
1 TABLET ORAL
Qty: 6 | Refills: 0
Start: 2025-07-19 | End: 2025-07-21

## 2025-07-19 RX ORDER — KETOROLAC TROMETHAMINE 30 MG/ML
15 INJECTION, SOLUTION INTRAMUSCULAR; INTRAVENOUS ONCE
Refills: 0 | Status: DISCONTINUED | OUTPATIENT
Start: 2025-07-19 | End: 2025-07-19

## 2025-07-19 RX ORDER — TAMSULOSIN HYDROCHLORIDE 0.4 MG/1
1 CAPSULE ORAL
Qty: 21 | Refills: 0
Start: 2025-07-19 | End: 2025-08-08

## 2025-07-19 RX ORDER — NAPROXEN SODIUM 275 MG
1 TABLET ORAL
Qty: 14 | Refills: 0
Start: 2025-07-19 | End: 2025-07-25

## 2025-07-19 RX ADMIN — KETOROLAC TROMETHAMINE 15 MILLIGRAM(S): 30 INJECTION, SOLUTION INTRAMUSCULAR; INTRAVENOUS at 01:44

## 2025-07-19 RX ADMIN — KETOROLAC TROMETHAMINE 15 MILLIGRAM(S): 30 INJECTION, SOLUTION INTRAMUSCULAR; INTRAVENOUS at 02:14

## 2025-07-19 NOTE — ED PROVIDER NOTE - OBJECTIVE STATEMENT
39-year-old male hx of kidney stones presenting with RLQ/ R groin pain for the past day, Tried to urinate - came out cloudy, unable to urinate now although he has the urge to urinate. +nausea, took flomax and zofran. No other symptoms. No testicular pain or swelling.

## 2025-07-19 NOTE — ED PROVIDER NOTE - PATIENT PORTAL LINK FT
You can access the FollowMyHealth Patient Portal offered by [facility] by registering at the following website: [email or web address]. By joining [facility]’s FollowMyHealth portal, you will also be able to view your health information using other applications (apps) compatible with our system.

## 2025-07-19 NOTE — ED PROVIDER NOTE - TEMPLATE, MLM
verbalizes understanding/demonstrates understanding of teaching/good return demonstration/needs met
General

## 2025-07-19 NOTE — CONSULT NOTE ADULT - SUBJECTIVE AND OBJECTIVE BOX
Urology Consultation    HPI:  39-year-old male hx of kidney stones presenting with RLQ/ R groin pain for the past day, Tried to urinate - came out cloudy, unable to urinate now although he has the urge to urinate. +nausea, took flomax and zofran. No other symptoms. No testicular pain or swelling.  Urology consulted for left ureteral stone. Patient seen and examined at bedside in the ED. States he has a history of kidney stones last episode  for which he passed by himself. He developed right sided groin pain/discomfort yesterday which felt similar to previous kidney stone. Had some associated nausea, no episodes of emesis. States he feels some incomplete voiding, urgency, and urine was cloudy at home. After does of toradol patients symptoms improved. Denies any left sided flank or groin pain. Follow up with his own urologist outpatient. No additional symptoms such as fever, chest pain, shortness of breath.     PAST MEDICAL & SURGICAL HISTORY:  Heart murmur  asymptomatic diagnosised as an infant      Anxiety      TREVER (obstructive sleep apnea)  does not use CPAP      Kidney stones      Ankle injury  right ankle tendon repair       Cyst  left chest wall removed under local anesthesia       S/P excision of lipoma   - posterior cervical neck      Bunion, left foot            MEDICATIONS  (STANDING):    MEDICATIONS  (PRN):      Allergies    penicillin (Other)    Intolerances        SOCIAL HISTORY:    FAMILY HISTORY:  FH: ovarian cancer  mother -  56y/o    FH: diabetes mellitus  pre diabetic - no medication        Vital Signs Last 24 Hrs  T(C): 36.8 (2025 00:08), Max: 36.8 (2025 00:08)  T(F): 98.3 (2025 00:08), Max: 98.3 (2025 00:08)  HR: 98 (2025 00:08) (98 - 98)  BP: 109/74 (2025 00:08) (109/74 - 109/74)  BP(mean): --  RR: 18 (2025 00:08) (18 - 18)  SpO2: 97% (2025 00:08) (97% - 97%)    Parameters below as of 2025 00:08  Patient On (Oxygen Delivery Method): room air        I&O's Summary    T(C): 36.8 (25 @ 00:08), Max: 36.8 (25 @ 00:08)  HR: 98 (25 @ 00:08) (98 - 98)  BP: 109/74 (25 @ 00:08) (109/74 - 109/74)  RR: 18 (25 @ 00:08) (18 - 18)  SpO2: 97% (25 @ 00:08) (97% - 97%)    CONSTITUTIONAL: Well groomed, no apparent distress  RESP: No respiratory distress, no use of accessory muscles  GI: Soft, NT, ND, no rebound, no guarding  : spontaneously voiding urine, PVR with 80cc urine  BACK: No cva tenderness bilaterally      LABS:                        13.1   6.71  )-----------( 181      ( 2025 01:22 )             38.9         139  |  105  |  14  ----------------------------<  107[H]  4.0   |  32[H]  |  1.47[H]    Ca    8.9      2025 01:22    TPro  6.2  /  Alb  3.8  /  TBili  0.6  /  DBili  x   /  AST  22  /  ALT  34  /  AlkPhos  53        Urinalysis Basic - ( 2025 01:22 )    Color: Yellow / Appearance: Clear / S.024 / pH: x  Gluc: 107 mg/dL / Ketone: x  / Bili: Negative / Urobili: 1.0 mg/dL   Blood: x / Protein: Negative mg/dL / Nitrite: Negative   Leuk Esterase: Negative / RBC: Too Numerous to count /HPF / WBC 0 /HPF   Sq Epi: x / Non Sq Epi: x / Bacteria: Few /HPF      CAPILLARY BLOOD GLUCOSE        LIVER FUNCTIONS - ( 2025 01:22 )  Alb: 3.8 g/dL / Pro: 6.2 g/dL / ALK PHOS: 53 U/L / ALT: 34 U/L DA / AST: 22 U/L / GGT: x             Cultures:      RADIOLOGY & ADDITIONAL STUDIES:  < from: CT Abdomen and Pelvis w/ IV Cont (25 @ 02:20) >  ACC: [id number] EXAM:  CT ABDOMEN AND PELVIS IC   ORDERED BY: JONAH OBRIEN     PROCEDURE DATE:  2025          INTERPRETATION:  CLINICAL INDICATION: RLQ/R groin pain    PROCEDURE:  Helical axial images were obtained from the domes of the diaphragm   through the pubic symphysis following the administration of intravenous   contrast. Coronal and sagittal reformats were also obtained.    CONTRAST/COMPLICATIONS:  IV Contrast: Omnipaque 350  90 cc administered   10 cc discarded  Oral Contrast: NONE  Complications: None    COMPARISON: 2025.    FINDINGS:    LOWER CHEST: Atelectasis.    LIVER: Decreased attenuation, likely steatosis. Focal fat near the   falciform ligament.  BILE DUCTS/GALLBLADDER: No obvious biliary dilatation. No significant   gallbladder edema.  PANCREAS: Unremarkable.  SPLEEN: Unremarkable.    ADRENALS: Unremarkable.  KIDNEYS/URETERS: Two left distal ureteral stones measuring up to 0.3 cm.   Prominent left distal ureter with periureteral stranding and suggestion  of slight urothelial enhancement. Bilateral renal stones measuring up to   0.2 cm again noted.  BLADDER: Partially distended.  REPRODUCTIVE ORGANS: Unremarkable.    BOWEL: Small hiatal hernia. No bowel obstruction. Unremarkable appendix.  PERITONEUM: No organized fluid collection or free air.  VESSELS: Normal caliber of the abdominal aorta.  RETROPERITONEUM/LYMPH NODE: Subcentimeter lymph nodes.  ABDOMINAL WALL/SOFT TISSUES: Small fat-containing umbilical and bilateral   inguinal hernia.  BONES: Degenerative changes of the spine. Nonspecific sclerotic foci at   the pelvic bones.    IMPRESSION:    No appendicitis.    Left distal ureteral stones with prominent left distal ureter,   periureteral stranding and suggestion of slight urothelial enhancement.   Recommend clinical correlation to assess UTI.    Additional findings as described.    --- End of Report ---    < end of copied text >

## 2025-07-19 NOTE — ED ADULT TRIAGE NOTE - CHIEF COMPLAINT QUOTE
Pt reports that  he has  RLQ abdominal pain /Right groin  pain started 1700 pm today with  hematuria urgency frequency , urine retention  . h/o left  kidney stones . took Flomax  and Zofran after 1800 pm.  Denies V/D

## 2025-07-19 NOTE — ED PROVIDER NOTE - CLINICAL SUMMARY MEDICAL DECISION MAKING FREE TEXT BOX
39-year-old male hx of kidney stones presenting with RLQ/ R groin pain for the past day, Tried to urinate - came out cloudy, unable to urinate now although he has the urge to urinate. +nausea, took flomax and zofran. No other symptoms. No testicular pain or swelling.    Labs ok. Urine with blood.  CTAP:   No appendicitis.  Left distal ureteral stones with prominent left distal ureter,   periureteral stranding and suggestion of slight urothelial enhancement.   Recommend clinical correlation to assess UTI.  Additional findings as described.  Bladder scan 82cc.   Seen by Urology - cleared for dc with flomax, pain meds, bactrim x3d, and Urology followup.

## 2025-07-19 NOTE — ED ADULT NURSE NOTE - CHPI ED NUR DURATION
today
I personally performed the service described in the documentation recorded by the scribe in my presence, and it accurately and completely records my words and actions.

## 2025-07-19 NOTE — ED ADULT NURSE NOTE - OBJECTIVE STATEMENT
Pt reports RLQ abdominal pain x 5 pm today. pt reports blood in urine and difficulty urinating x 5 pm today. pt reports urge to void but unable to fully void. pt denies fever or chills. pt endorsing Nausea, denies vomiting. pt reports he took zofran and flomax.

## 2025-07-19 NOTE — ED ADULT NURSE NOTE - NSFALLUNIVINTERV_ED_ALL_ED
Bed/Stretcher in lowest position, wheels locked, appropriate side rails in place/Call bell, personal items and telephone in reach/Instruct patient to call for assistance before getting out of bed/chair/stretcher/Non-slip footwear applied when patient is off stretcher/Munger to call system/Physically safe environment - no spills, clutter or unnecessary equipment/Purposeful proactive rounding/Room/bathroom lighting operational, light cord in reach

## 2025-07-19 NOTE — CONSULT NOTE ADULT - ASSESSMENT
39M w/ left distal ureteral stones measuring up to 3mm  WCT wnl, Cr. 1.47, vss afebrile  UA negative       Plan:  -No acute urology intervention  -Recommend discharge home with flomax  -Aggressive oral hydration  -Pt requesting abx, can send with 3-5 days abx  -Urine straining  -OTC pain medication advil/tylenol as needed  -Follow up outpatient with his own urologist  -Please return to ED incase of fever, worsening symptoms with severe pain, nausea/vomiting, unable to void  -Discussed with Dr. Scales

## 2025-07-19 NOTE — ED PROVIDER NOTE - NSFOLLOWUPCLINICS_GEN_ALL_ED_FT
[facility]  Urology  [address] [place] [address]  Phone: [phone]  Fax: [phone]

## 2025-07-19 NOTE — ED PROVIDER NOTE - NSFOLLOWUPINSTRUCTIONS_ED_ALL_ED_FT
You were seen in the emergency department for: pain  Your diagnosis for this visit was: kidney stone  From this ED visit you were prescribed: Naproxen for pain control. Flomax. Antibiotics,.   Make sure you stay hydrated.   We recommend you follow up with: your primary care doctor and Urology.    Please return to the Emergency Department if you experience any of the following symptoms:   - Shortness of breath or trouble breathing  - Pressure, pain or tightness in the chest  - Face drooping, arm weakness or speech difficulty  - Persistence of severe vomiting  - Head injury or loss of consciousness  - Nonstop bleeding or an open wound    (1) Follow up with your primary care physician within the next 24-48 hours as discussed. In addition, we did not find evidence of a life threatening illness on your testing here today, but listed below are the specialists that will be necessary to see as an outpatient to continue the workup.  Please call the numbers listed below or [phone] to set up the necessary appointments.  (2) Take Tylenol (up to 1000mg or 1 g)  and/or Motrin (up to 600mg) up to every 6 hours as needed for pain.   (3) If you had an IV (intravenous) line placed, it was removed. Sometimes, after IV removal, that area can be tender for a few days; if it develops redness and swelling, those could be signs of infection; in which case, return to the Emergency Department for assessment.  (4) Please continue taking all of your home medications as directed.